# Patient Record
Sex: MALE | Race: WHITE | Employment: FULL TIME | URBAN - METROPOLITAN AREA
[De-identification: names, ages, dates, MRNs, and addresses within clinical notes are randomized per-mention and may not be internally consistent; named-entity substitution may affect disease eponyms.]

---

## 2017-01-26 ENCOUNTER — OFFICE VISIT (OUTPATIENT)
Dept: PSYCHIATRY | Age: 32
End: 2017-01-26

## 2017-01-26 VITALS — SYSTOLIC BLOOD PRESSURE: 134 MMHG | BODY MASS INDEX: 29.16 KG/M2 | DIASTOLIC BLOOD PRESSURE: 88 MMHG | WEIGHT: 221 LBS

## 2017-01-26 DIAGNOSIS — F33.1 MODERATE EPISODE OF RECURRENT MAJOR DEPRESSIVE DISORDER (HCC): Primary | ICD-10-CM

## 2017-01-26 PROCEDURE — 99214 OFFICE O/P EST MOD 30 MIN: CPT | Performed by: PSYCHIATRY & NEUROLOGY

## 2017-01-26 RX ORDER — TRAZODONE HYDROCHLORIDE 50 MG/1
50 TABLET ORAL NIGHTLY PRN
Qty: 30 TABLET | Refills: 1 | Status: SHIPPED | OUTPATIENT
Start: 2017-01-26 | End: 2017-03-17 | Stop reason: SDUPTHER

## 2017-01-26 RX ORDER — DULOXETIN HYDROCHLORIDE 30 MG/1
90 CAPSULE, DELAYED RELEASE ORAL EVERY MORNING
Qty: 90 CAPSULE | Refills: 3 | Status: SHIPPED | OUTPATIENT
Start: 2017-01-26 | End: 2017-03-17 | Stop reason: SDUPTHER

## 2017-01-26 ASSESSMENT — ENCOUNTER SYMPTOMS
VOMITING: 0
BLURRED VISION: 0
ABDOMINAL PAIN: 0
NAUSEA: 0
SHORTNESS OF BREATH: 0
DIARRHEA: 0
CONSTIPATION: 0

## 2017-03-17 ENCOUNTER — OFFICE VISIT (OUTPATIENT)
Dept: PSYCHIATRY | Age: 32
End: 2017-03-17

## 2017-03-17 VITALS — SYSTOLIC BLOOD PRESSURE: 124 MMHG | DIASTOLIC BLOOD PRESSURE: 72 MMHG | WEIGHT: 229.4 LBS | BODY MASS INDEX: 30.27 KG/M2

## 2017-03-17 DIAGNOSIS — F33.1 MODERATE EPISODE OF RECURRENT MAJOR DEPRESSIVE DISORDER (HCC): Primary | ICD-10-CM

## 2017-03-17 PROCEDURE — 99214 OFFICE O/P EST MOD 30 MIN: CPT | Performed by: PSYCHIATRY & NEUROLOGY

## 2017-03-17 RX ORDER — DULOXETIN HYDROCHLORIDE 60 MG/1
60 CAPSULE, DELAYED RELEASE ORAL DAILY
Qty: 30 CAPSULE | Refills: 5 | Status: SHIPPED | OUTPATIENT
Start: 2017-03-17 | End: 2017-09-25 | Stop reason: SDUPTHER

## 2017-03-17 RX ORDER — GABAPENTIN 300 MG/1
300 CAPSULE ORAL 3 TIMES DAILY
Qty: 90 CAPSULE | Refills: 5 | Status: SHIPPED | OUTPATIENT
Start: 2017-03-17 | End: 2017-09-25

## 2017-03-17 RX ORDER — DULOXETIN HYDROCHLORIDE 30 MG/1
30 CAPSULE, DELAYED RELEASE ORAL EVERY MORNING
Qty: 30 CAPSULE | Refills: 5 | Status: SHIPPED | OUTPATIENT
Start: 2017-03-17 | End: 2017-09-25 | Stop reason: SDUPTHER

## 2017-03-17 RX ORDER — TRAZODONE HYDROCHLORIDE 50 MG/1
50 TABLET ORAL NIGHTLY PRN
Qty: 30 TABLET | Refills: 5 | Status: SHIPPED | OUTPATIENT
Start: 2017-03-17 | End: 2017-10-23 | Stop reason: SDUPTHER

## 2017-03-17 ASSESSMENT — ENCOUNTER SYMPTOMS
VOMITING: 0
ABDOMINAL PAIN: 0
BLURRED VISION: 0
NAUSEA: 0
DIARRHEA: 0
CONSTIPATION: 0
SHORTNESS OF BREATH: 0

## 2017-07-06 ENCOUNTER — OFFICE VISIT (OUTPATIENT)
Dept: FAMILY MEDICINE CLINIC | Age: 32
End: 2017-07-06

## 2017-07-06 VITALS
HEART RATE: 70 BPM | HEIGHT: 73 IN | SYSTOLIC BLOOD PRESSURE: 136 MMHG | OXYGEN SATURATION: 96 % | RESPIRATION RATE: 18 BRPM | DIASTOLIC BLOOD PRESSURE: 84 MMHG | BODY MASS INDEX: 29.26 KG/M2 | WEIGHT: 220.8 LBS

## 2017-07-06 DIAGNOSIS — K42.9 UMBILICAL HERNIA WITHOUT OBSTRUCTION AND WITHOUT GANGRENE: ICD-10-CM

## 2017-07-06 PROCEDURE — 99213 OFFICE O/P EST LOW 20 MIN: CPT | Performed by: INTERNAL MEDICINE

## 2017-07-06 ASSESSMENT — ENCOUNTER SYMPTOMS
ALLERGIC/IMMUNOLOGIC NEGATIVE: 1
DIARRHEA: 0
CONSTIPATION: 0
BLOOD IN STOOL: 0
RECTAL PAIN: 0
ABDOMINAL PAIN: 1
ABDOMINAL DISTENTION: 0
VOMITING: 0
ANAL BLEEDING: 0
NAUSEA: 0
RESPIRATORY NEGATIVE: 1

## 2017-07-06 ASSESSMENT — PATIENT HEALTH QUESTIONNAIRE - PHQ9
1. LITTLE INTEREST OR PLEASURE IN DOING THINGS: 1
SUM OF ALL RESPONSES TO PHQ9 QUESTIONS 1 & 2: 1
2. FEELING DOWN, DEPRESSED OR HOPELESS: 0
SUM OF ALL RESPONSES TO PHQ QUESTIONS 1-9: 1

## 2017-07-13 ENCOUNTER — OFFICE VISIT (OUTPATIENT)
Dept: PSYCHOLOGY | Age: 32
End: 2017-07-13

## 2017-07-13 DIAGNOSIS — F40.298 SPECIFIC PHOBIA: Primary | ICD-10-CM

## 2017-07-13 DIAGNOSIS — F33.1 MDD (MAJOR DEPRESSIVE DISORDER), RECURRENT EPISODE, MODERATE (HCC): ICD-10-CM

## 2017-07-13 PROCEDURE — 90791 PSYCH DIAGNOSTIC EVALUATION: CPT | Performed by: SOCIAL WORKER

## 2017-07-13 ASSESSMENT — PATIENT HEALTH QUESTIONNAIRE - PHQ9
2. FEELING DOWN, DEPRESSED OR HOPELESS: 1
3. TROUBLE FALLING OR STAYING ASLEEP: 2
SUM OF ALL RESPONSES TO PHQ9 QUESTIONS 1 & 2: 2
4. FEELING TIRED OR HAVING LITTLE ENERGY: 1
SUM OF ALL RESPONSES TO PHQ QUESTIONS 1-9: 10
5. POOR APPETITE OR OVEREATING: 0
9. THOUGHTS THAT YOU WOULD BE BETTER OFF DEAD, OR OF HURTING YOURSELF: 0
10. IF YOU CHECKED OFF ANY PROBLEMS, HOW DIFFICULT HAVE THESE PROBLEMS MADE IT FOR YOU TO DO YOUR WORK, TAKE CARE OF THINGS AT HOME, OR GET ALONG WITH OTHER PEOPLE: 3
7. TROUBLE CONCENTRATING ON THINGS, SUCH AS READING THE NEWSPAPER OR WATCHING TELEVISION: 1
8. MOVING OR SPEAKING SO SLOWLY THAT OTHER PEOPLE COULD HAVE NOTICED. OR THE OPPOSITE, BEING SO FIGETY OR RESTLESS THAT YOU HAVE BEEN MOVING AROUND A LOT MORE THAN USUAL: 1
1. LITTLE INTEREST OR PLEASURE IN DOING THINGS: 1
6. FEELING BAD ABOUT YOURSELF - OR THAT YOU ARE A FAILURE OR HAVE LET YOURSELF OR YOUR FAMILY DOWN: 3

## 2017-07-14 ENCOUNTER — INITIAL CONSULT (OUTPATIENT)
Dept: SURGERY | Age: 32
End: 2017-07-14

## 2017-07-14 ENCOUNTER — SURG/PROC ORDERS (OUTPATIENT)
Dept: SURGERY | Age: 32
End: 2017-07-14

## 2017-07-14 VITALS
HEIGHT: 74 IN | HEART RATE: 75 BPM | SYSTOLIC BLOOD PRESSURE: 113 MMHG | BODY MASS INDEX: 28.42 KG/M2 | DIASTOLIC BLOOD PRESSURE: 76 MMHG | WEIGHT: 221.4 LBS

## 2017-07-14 DIAGNOSIS — K42.9 UMBILICAL HERNIA WITHOUT OBSTRUCTION AND WITHOUT GANGRENE: Primary | ICD-10-CM

## 2017-07-14 PROCEDURE — 99242 OFF/OP CONSLTJ NEW/EST SF 20: CPT | Performed by: SURGERY

## 2017-07-14 RX ORDER — SODIUM CHLORIDE 0.9 % (FLUSH) 0.9 %
10 SYRINGE (ML) INJECTION PRN
Status: CANCELLED | OUTPATIENT
Start: 2017-07-14

## 2017-07-14 RX ORDER — SODIUM CHLORIDE 0.9 % (FLUSH) 0.9 %
10 SYRINGE (ML) INJECTION EVERY 12 HOURS SCHEDULED
Status: CANCELLED | OUTPATIENT
Start: 2017-07-14

## 2017-07-25 ENCOUNTER — OFFICE VISIT (OUTPATIENT)
Dept: FAMILY MEDICINE CLINIC | Age: 32
End: 2017-07-25

## 2017-07-25 VITALS
DIASTOLIC BLOOD PRESSURE: 80 MMHG | BODY MASS INDEX: 28.06 KG/M2 | SYSTOLIC BLOOD PRESSURE: 118 MMHG | OXYGEN SATURATION: 95 % | HEART RATE: 67 BPM | HEIGHT: 74 IN | RESPIRATION RATE: 18 BRPM | WEIGHT: 218.6 LBS

## 2017-07-25 DIAGNOSIS — I10 ESSENTIAL HYPERTENSION, BENIGN: Primary | ICD-10-CM

## 2017-07-25 DIAGNOSIS — F41.8 DEPRESSION WITH ANXIETY: ICD-10-CM

## 2017-07-25 DIAGNOSIS — E78.00 PURE HYPERCHOLESTEROLEMIA: ICD-10-CM

## 2017-07-25 DIAGNOSIS — K42.9 UMBILICAL HERNIA WITHOUT OBSTRUCTION AND WITHOUT GANGRENE: ICD-10-CM

## 2017-07-25 LAB
ALBUMIN SERPL-MCNC: 4.8 G/DL (ref 3.4–5)
ALP BLD-CCNC: 103 U/L (ref 40–129)
ALT SERPL-CCNC: 68 U/L (ref 10–40)
ANION GAP SERPL CALCULATED.3IONS-SCNC: 18 MMOL/L (ref 3–16)
AST SERPL-CCNC: 61 U/L (ref 15–37)
BILIRUB SERPL-MCNC: 0.5 MG/DL (ref 0–1)
BILIRUBIN DIRECT: <0.2 MG/DL (ref 0–0.3)
BILIRUBIN, INDIRECT: ABNORMAL MG/DL (ref 0–1)
BUN BLDV-MCNC: 7 MG/DL (ref 7–20)
CALCIUM SERPL-MCNC: 9.9 MG/DL (ref 8.3–10.6)
CHLORIDE BLD-SCNC: 99 MMOL/L (ref 99–110)
CHOLESTEROL, TOTAL: 234 MG/DL (ref 0–199)
CO2: 25 MMOL/L (ref 21–32)
CREAT SERPL-MCNC: 0.7 MG/DL (ref 0.9–1.3)
GFR AFRICAN AMERICAN: >60
GFR NON-AFRICAN AMERICAN: >60
GLUCOSE BLD-MCNC: 85 MG/DL (ref 70–99)
HDLC SERPL-MCNC: 73 MG/DL (ref 40–60)
LDL CHOLESTEROL CALCULATED: ABNORMAL MG/DL
LDL CHOLESTEROL DIRECT: 124 MG/DL
POTASSIUM SERPL-SCNC: 4.6 MMOL/L (ref 3.5–5.1)
SODIUM BLD-SCNC: 142 MMOL/L (ref 136–145)
TOTAL PROTEIN: 7.3 G/DL (ref 6.4–8.2)
TRIGL SERPL-MCNC: 339 MG/DL (ref 0–150)
VLDLC SERPL CALC-MCNC: ABNORMAL MG/DL

## 2017-07-25 PROCEDURE — 99214 OFFICE O/P EST MOD 30 MIN: CPT | Performed by: INTERNAL MEDICINE

## 2017-07-25 PROCEDURE — 36415 COLL VENOUS BLD VENIPUNCTURE: CPT | Performed by: INTERNAL MEDICINE

## 2017-07-25 RX ORDER — ROSUVASTATIN CALCIUM 40 MG/1
40 TABLET, COATED ORAL DAILY
Qty: 30 TABLET | Refills: 5 | Status: SHIPPED | OUTPATIENT
Start: 2017-07-25 | End: 2017-12-14 | Stop reason: SDUPTHER

## 2017-07-25 RX ORDER — AMLODIPINE BESYLATE AND BENAZEPRIL HYDROCHLORIDE 5; 20 MG/1; MG/1
1 CAPSULE ORAL DAILY
Qty: 30 CAPSULE | Refills: 5 | Status: SHIPPED | OUTPATIENT
Start: 2017-07-25 | End: 2017-12-14 | Stop reason: SDUPTHER

## 2017-07-25 ASSESSMENT — ENCOUNTER SYMPTOMS
ALLERGIC/IMMUNOLOGIC NEGATIVE: 1
RESPIRATORY NEGATIVE: 1
GASTROINTESTINAL NEGATIVE: 1

## 2017-07-26 ENCOUNTER — OFFICE VISIT (OUTPATIENT)
Dept: PSYCHOLOGY | Age: 32
End: 2017-07-26

## 2017-07-26 DIAGNOSIS — F40.298 SPECIFIC PHOBIA: Primary | ICD-10-CM

## 2017-07-26 PROCEDURE — 90832 PSYTX W PT 30 MINUTES: CPT | Performed by: SOCIAL WORKER

## 2017-07-26 ASSESSMENT — PATIENT HEALTH QUESTIONNAIRE - PHQ9
SUM OF ALL RESPONSES TO PHQ QUESTIONS 1-9: 5
8. MOVING OR SPEAKING SO SLOWLY THAT OTHER PEOPLE COULD HAVE NOTICED. OR THE OPPOSITE, BEING SO FIGETY OR RESTLESS THAT YOU HAVE BEEN MOVING AROUND A LOT MORE THAN USUAL: 0
4. FEELING TIRED OR HAVING LITTLE ENERGY: 0
10. IF YOU CHECKED OFF ANY PROBLEMS, HOW DIFFICULT HAVE THESE PROBLEMS MADE IT FOR YOU TO DO YOUR WORK, TAKE CARE OF THINGS AT HOME, OR GET ALONG WITH OTHER PEOPLE: 0
5. POOR APPETITE OR OVEREATING: 0
SUM OF ALL RESPONSES TO PHQ9 QUESTIONS 1 & 2: 2
7. TROUBLE CONCENTRATING ON THINGS, SUCH AS READING THE NEWSPAPER OR WATCHING TELEVISION: 1
6. FEELING BAD ABOUT YOURSELF - OR THAT YOU ARE A FAILURE OR HAVE LET YOURSELF OR YOUR FAMILY DOWN: 1
1. LITTLE INTEREST OR PLEASURE IN DOING THINGS: 1
2. FEELING DOWN, DEPRESSED OR HOPELESS: 1
3. TROUBLE FALLING OR STAYING ASLEEP: 1
9. THOUGHTS THAT YOU WOULD BE BETTER OFF DEAD, OR OF HURTING YOURSELF: 0

## 2017-07-27 DIAGNOSIS — E78.00 PURE HYPERCHOLESTEROLEMIA: Primary | ICD-10-CM

## 2017-07-27 RX ORDER — FENOFIBRATE 160 MG/1
160 TABLET ORAL DAILY
Qty: 30 TABLET | Refills: 1 | Status: SHIPPED | OUTPATIENT
Start: 2017-07-27 | End: 2017-10-22 | Stop reason: SDUPTHER

## 2017-08-04 ENCOUNTER — OFFICE VISIT (OUTPATIENT)
Dept: FAMILY MEDICINE CLINIC | Age: 32
End: 2017-08-04

## 2017-08-04 VITALS
BODY MASS INDEX: 28.11 KG/M2 | HEIGHT: 74 IN | WEIGHT: 219 LBS | DIASTOLIC BLOOD PRESSURE: 80 MMHG | SYSTOLIC BLOOD PRESSURE: 122 MMHG | OXYGEN SATURATION: 98 % | HEART RATE: 70 BPM

## 2017-08-04 DIAGNOSIS — Z01.818 PREOP EXAMINATION: ICD-10-CM

## 2017-08-04 DIAGNOSIS — K42.9 UMBILICAL HERNIA WITHOUT OBSTRUCTION AND WITHOUT GANGRENE: Primary | ICD-10-CM

## 2017-08-04 PROCEDURE — 99242 OFF/OP CONSLTJ NEW/EST SF 20: CPT | Performed by: PHYSICIAN ASSISTANT

## 2017-08-09 ENCOUNTER — OFFICE VISIT (OUTPATIENT)
Dept: PSYCHOLOGY | Age: 32
End: 2017-08-09

## 2017-08-09 DIAGNOSIS — F40.298 SPECIFIC PHOBIA: Primary | ICD-10-CM

## 2017-08-09 PROCEDURE — 90832 PSYTX W PT 30 MINUTES: CPT | Performed by: SOCIAL WORKER

## 2017-08-09 ASSESSMENT — PATIENT HEALTH QUESTIONNAIRE - PHQ9
2. FEELING DOWN, DEPRESSED OR HOPELESS: 1
10. IF YOU CHECKED OFF ANY PROBLEMS, HOW DIFFICULT HAVE THESE PROBLEMS MADE IT FOR YOU TO DO YOUR WORK, TAKE CARE OF THINGS AT HOME, OR GET ALONG WITH OTHER PEOPLE: 2
SUM OF ALL RESPONSES TO PHQ QUESTIONS 1-9: 13
4. FEELING TIRED OR HAVING LITTLE ENERGY: 2
SUM OF ALL RESPONSES TO PHQ9 QUESTIONS 1 & 2: 2
7. TROUBLE CONCENTRATING ON THINGS, SUCH AS READING THE NEWSPAPER OR WATCHING TELEVISION: 2
6. FEELING BAD ABOUT YOURSELF - OR THAT YOU ARE A FAILURE OR HAVE LET YOURSELF OR YOUR FAMILY DOWN: 2
1. LITTLE INTEREST OR PLEASURE IN DOING THINGS: 1
8. MOVING OR SPEAKING SO SLOWLY THAT OTHER PEOPLE COULD HAVE NOTICED. OR THE OPPOSITE, BEING SO FIGETY OR RESTLESS THAT YOU HAVE BEEN MOVING AROUND A LOT MORE THAN USUAL: 1
5. POOR APPETITE OR OVEREATING: 1
9. THOUGHTS THAT YOU WOULD BE BETTER OFF DEAD, OR OF HURTING YOURSELF: 0
3. TROUBLE FALLING OR STAYING ASLEEP: 3

## 2017-08-10 ENCOUNTER — TELEPHONE (OUTPATIENT)
Dept: SURGERY | Age: 32
End: 2017-08-10

## 2017-08-10 RX ORDER — BUSPIRONE HYDROCHLORIDE 7.5 MG/1
7.5 TABLET ORAL 2 TIMES DAILY
Qty: 60 TABLET | Refills: 0 | Status: SHIPPED | OUTPATIENT
Start: 2017-08-10 | End: 2017-09-25

## 2017-08-17 ENCOUNTER — HOSPITAL ENCOUNTER (OUTPATIENT)
Dept: SURGERY | Age: 32
Discharge: HOME | End: 2017-08-17
Attending: SURGERY | Admitting: SURGERY

## 2017-08-17 VITALS
WEIGHT: 230 LBS | OXYGEN SATURATION: 100 % | TEMPERATURE: 97.9 F | SYSTOLIC BLOOD PRESSURE: 108 MMHG | RESPIRATION RATE: 16 BRPM | HEIGHT: 74 IN | BODY MASS INDEX: 29.52 KG/M2 | HEART RATE: 78 BPM | DIASTOLIC BLOOD PRESSURE: 56 MMHG

## 2017-08-17 PROCEDURE — 49585 REPAIR UMBILICAL HERN,5+Y/O,REDUC: CPT | Performed by: SURGERY

## 2017-08-17 RX ORDER — HYDRALAZINE HYDROCHLORIDE 20 MG/ML
5 INJECTION INTRAMUSCULAR; INTRAVENOUS EVERY 10 MIN PRN
Status: DISCONTINUED | OUTPATIENT
Start: 2017-08-17 | End: 2017-08-17 | Stop reason: SDUPTHER

## 2017-08-17 RX ORDER — SODIUM CHLORIDE 0.9 % (FLUSH) 0.9 %
10 SYRINGE (ML) INJECTION PRN
Status: DISCONTINUED | OUTPATIENT
Start: 2017-08-17 | End: 2017-08-18 | Stop reason: HOSPADM

## 2017-08-17 RX ORDER — DIPHENHYDRAMINE HYDROCHLORIDE 50 MG/ML
12.5 INJECTION INTRAMUSCULAR; INTRAVENOUS
Status: ACTIVE | OUTPATIENT
Start: 2017-08-17 | End: 2017-08-17

## 2017-08-17 RX ORDER — PROMETHAZINE HYDROCHLORIDE 25 MG/ML
6.25 INJECTION, SOLUTION INTRAMUSCULAR; INTRAVENOUS EVERY 10 MIN PRN
Status: DISCONTINUED | OUTPATIENT
Start: 2017-08-17 | End: 2017-08-18 | Stop reason: HOSPADM

## 2017-08-17 RX ORDER — MEPERIDINE HYDROCHLORIDE 50 MG/ML
12.5 INJECTION INTRAMUSCULAR; INTRAVENOUS; SUBCUTANEOUS EVERY 5 MIN PRN
Status: DISCONTINUED | OUTPATIENT
Start: 2017-08-17 | End: 2017-08-17 | Stop reason: SDUPTHER

## 2017-08-17 RX ORDER — OXYCODONE HYDROCHLORIDE AND ACETAMINOPHEN 5; 325 MG/1; MG/1
1 TABLET ORAL
Status: DISPENSED | OUTPATIENT
Start: 2017-08-17 | End: 2017-08-17

## 2017-08-17 RX ORDER — HYDRALAZINE HYDROCHLORIDE 20 MG/ML
5 INJECTION INTRAMUSCULAR; INTRAVENOUS EVERY 10 MIN PRN
Status: DISCONTINUED | OUTPATIENT
Start: 2017-08-17 | End: 2017-08-18 | Stop reason: HOSPADM

## 2017-08-17 RX ORDER — MORPHINE SULFATE 2 MG/ML
2 INJECTION, SOLUTION INTRAMUSCULAR; INTRAVENOUS EVERY 5 MIN PRN
Status: DISCONTINUED | OUTPATIENT
Start: 2017-08-17 | End: 2017-08-18 | Stop reason: HOSPADM

## 2017-08-17 RX ORDER — MORPHINE SULFATE 2 MG/ML
1 INJECTION, SOLUTION INTRAMUSCULAR; INTRAVENOUS EVERY 5 MIN PRN
Status: DISCONTINUED | OUTPATIENT
Start: 2017-08-17 | End: 2017-08-18 | Stop reason: HOSPADM

## 2017-08-17 RX ORDER — PROMETHAZINE HYDROCHLORIDE 25 MG/ML
6.25 INJECTION, SOLUTION INTRAMUSCULAR; INTRAVENOUS
Status: ACTIVE | OUTPATIENT
Start: 2017-08-17 | End: 2017-08-17

## 2017-08-17 RX ORDER — MEPERIDINE HYDROCHLORIDE 50 MG/ML
12.5 INJECTION INTRAMUSCULAR; INTRAVENOUS; SUBCUTANEOUS EVERY 5 MIN PRN
Status: DISCONTINUED | OUTPATIENT
Start: 2017-08-17 | End: 2017-08-18 | Stop reason: HOSPADM

## 2017-08-17 RX ORDER — LABETALOL HYDROCHLORIDE 5 MG/ML
5 INJECTION, SOLUTION INTRAVENOUS EVERY 10 MIN PRN
Status: DISCONTINUED | OUTPATIENT
Start: 2017-08-17 | End: 2017-08-17 | Stop reason: SDUPTHER

## 2017-08-17 RX ORDER — OXYCODONE HYDROCHLORIDE AND ACETAMINOPHEN 5; 325 MG/1; MG/1
1-2 TABLET ORAL EVERY 4 HOURS PRN
Qty: 20 TABLET | Refills: 0 | Status: SHIPPED | OUTPATIENT
Start: 2017-08-17 | End: 2017-08-24

## 2017-08-17 RX ORDER — ONDANSETRON 2 MG/ML
4 INJECTION INTRAMUSCULAR; INTRAVENOUS
Status: DISCONTINUED | OUTPATIENT
Start: 2017-08-17 | End: 2017-08-17 | Stop reason: SDUPTHER

## 2017-08-17 RX ORDER — SODIUM CHLORIDE, SODIUM LACTATE, POTASSIUM CHLORIDE, CALCIUM CHLORIDE 600; 310; 30; 20 MG/100ML; MG/100ML; MG/100ML; MG/100ML
INJECTION, SOLUTION INTRAVENOUS CONTINUOUS
Status: DISCONTINUED | OUTPATIENT
Start: 2017-08-17 | End: 2017-08-18 | Stop reason: HOSPADM

## 2017-08-17 RX ORDER — LABETALOL HYDROCHLORIDE 5 MG/ML
5 INJECTION, SOLUTION INTRAVENOUS EVERY 10 MIN PRN
Status: DISCONTINUED | OUTPATIENT
Start: 2017-08-17 | End: 2017-08-18 | Stop reason: HOSPADM

## 2017-08-17 RX ORDER — OXYCODONE HYDROCHLORIDE AND ACETAMINOPHEN 5; 325 MG/1; MG/1
2 TABLET ORAL PRN
Status: COMPLETED | OUTPATIENT
Start: 2017-08-17 | End: 2017-08-17

## 2017-08-17 RX ORDER — OXYCODONE HYDROCHLORIDE AND ACETAMINOPHEN 5; 325 MG/1; MG/1
1 TABLET ORAL PRN
Status: COMPLETED | OUTPATIENT
Start: 2017-08-17 | End: 2017-08-17

## 2017-08-17 RX ORDER — SODIUM CHLORIDE 0.9 % (FLUSH) 0.9 %
10 SYRINGE (ML) INJECTION EVERY 12 HOURS SCHEDULED
Status: DISCONTINUED | OUTPATIENT
Start: 2017-08-17 | End: 2017-08-18 | Stop reason: HOSPADM

## 2017-08-17 RX ORDER — ONDANSETRON 2 MG/ML
4 INJECTION INTRAMUSCULAR; INTRAVENOUS
Status: ACTIVE | OUTPATIENT
Start: 2017-08-17 | End: 2017-08-17

## 2017-08-17 RX ADMIN — Medication 0.25 MG: at 09:24

## 2017-08-17 RX ADMIN — SODIUM CHLORIDE, SODIUM LACTATE, POTASSIUM CHLORIDE, CALCIUM CHLORIDE: 600; 310; 30; 20 INJECTION, SOLUTION INTRAVENOUS at 06:52

## 2017-08-17 RX ADMIN — OXYCODONE HYDROCHLORIDE AND ACETAMINOPHEN 2 TABLET: 5; 325 TABLET ORAL at 09:15

## 2017-08-17 RX ADMIN — Medication 0.25 MG: at 09:12

## 2017-08-17 RX ADMIN — Medication 0.25 MG: at 09:04

## 2017-08-17 ASSESSMENT — PAIN SCALES - GENERAL
PAINLEVEL_OUTOF10: 6
PAINLEVEL_OUTOF10: 4

## 2017-08-17 ASSESSMENT — PAIN DESCRIPTION - LOCATION: LOCATION: ABDOMEN

## 2017-08-17 ASSESSMENT — PAIN DESCRIPTION - DESCRIPTORS: DESCRIPTORS: SORE

## 2017-08-17 ASSESSMENT — PAIN DESCRIPTION - PAIN TYPE: TYPE: SURGICAL PAIN

## 2017-08-24 ENCOUNTER — OFFICE VISIT (OUTPATIENT)
Dept: PSYCHOLOGY | Age: 32
End: 2017-08-24

## 2017-08-24 ENCOUNTER — NURSE ONLY (OUTPATIENT)
Dept: FAMILY MEDICINE CLINIC | Age: 32
End: 2017-08-24

## 2017-08-24 DIAGNOSIS — E78.00 PURE HYPERCHOLESTEROLEMIA: ICD-10-CM

## 2017-08-24 DIAGNOSIS — F40.298 SPECIFIC PHOBIA: Primary | ICD-10-CM

## 2017-08-24 LAB
ALBUMIN SERPL-MCNC: 4.6 G/DL (ref 3.4–5)
ALP BLD-CCNC: 61 U/L (ref 40–129)
ALT SERPL-CCNC: 30 U/L (ref 10–40)
ANION GAP SERPL CALCULATED.3IONS-SCNC: 17 MMOL/L (ref 3–16)
AST SERPL-CCNC: 34 U/L (ref 15–37)
BILIRUB SERPL-MCNC: 0.3 MG/DL (ref 0–1)
BILIRUBIN DIRECT: <0.2 MG/DL (ref 0–0.3)
BILIRUBIN, INDIRECT: NORMAL MG/DL (ref 0–1)
BUN BLDV-MCNC: 11 MG/DL (ref 7–20)
CALCIUM SERPL-MCNC: 10 MG/DL (ref 8.3–10.6)
CHLORIDE BLD-SCNC: 99 MMOL/L (ref 99–110)
CHOLESTEROL, TOTAL: 246 MG/DL (ref 0–199)
CO2: 26 MMOL/L (ref 21–32)
CREAT SERPL-MCNC: 0.8 MG/DL (ref 0.9–1.3)
GFR AFRICAN AMERICAN: >60
GFR NON-AFRICAN AMERICAN: >60
GLUCOSE BLD-MCNC: 85 MG/DL (ref 70–99)
HDLC SERPL-MCNC: 60 MG/DL (ref 40–60)
LDL CHOLESTEROL CALCULATED: ABNORMAL MG/DL
LDL CHOLESTEROL DIRECT: 132 MG/DL
PHOSPHORUS: 4 MG/DL (ref 2.5–4.9)
POTASSIUM SERPL-SCNC: 5 MMOL/L (ref 3.5–5.1)
SODIUM BLD-SCNC: 142 MMOL/L (ref 136–145)
TOTAL CK: 106 U/L (ref 39–308)
TOTAL PROTEIN: 7 G/DL (ref 6.4–8.2)
TRIGL SERPL-MCNC: 414 MG/DL (ref 0–150)
VLDLC SERPL CALC-MCNC: ABNORMAL MG/DL

## 2017-08-24 PROCEDURE — 90832 PSYTX W PT 30 MINUTES: CPT | Performed by: SOCIAL WORKER

## 2017-08-24 PROCEDURE — 36415 COLL VENOUS BLD VENIPUNCTURE: CPT | Performed by: INTERNAL MEDICINE

## 2017-08-24 ASSESSMENT — PATIENT HEALTH QUESTIONNAIRE - PHQ9
7. TROUBLE CONCENTRATING ON THINGS, SUCH AS READING THE NEWSPAPER OR WATCHING TELEVISION: 1
9. THOUGHTS THAT YOU WOULD BE BETTER OFF DEAD, OR OF HURTING YOURSELF: 0
4. FEELING TIRED OR HAVING LITTLE ENERGY: 2
SUM OF ALL RESPONSES TO PHQ QUESTIONS 1-9: 10
8. MOVING OR SPEAKING SO SLOWLY THAT OTHER PEOPLE COULD HAVE NOTICED. OR THE OPPOSITE, BEING SO FIGETY OR RESTLESS THAT YOU HAVE BEEN MOVING AROUND A LOT MORE THAN USUAL: 0
SUM OF ALL RESPONSES TO PHQ9 QUESTIONS 1 & 2: 3
1. LITTLE INTEREST OR PLEASURE IN DOING THINGS: 2
3. TROUBLE FALLING OR STAYING ASLEEP: 3
6. FEELING BAD ABOUT YOURSELF - OR THAT YOU ARE A FAILURE OR HAVE LET YOURSELF OR YOUR FAMILY DOWN: 1
5. POOR APPETITE OR OVEREATING: 0
10. IF YOU CHECKED OFF ANY PROBLEMS, HOW DIFFICULT HAVE THESE PROBLEMS MADE IT FOR YOU TO DO YOUR WORK, TAKE CARE OF THINGS AT HOME, OR GET ALONG WITH OTHER PEOPLE: 1
2. FEELING DOWN, DEPRESSED OR HOPELESS: 1

## 2017-09-05 ENCOUNTER — OFFICE VISIT (OUTPATIENT)
Dept: SURGERY | Age: 32
End: 2017-09-05

## 2017-09-05 VITALS
SYSTOLIC BLOOD PRESSURE: 115 MMHG | WEIGHT: 231 LBS | BODY MASS INDEX: 30.62 KG/M2 | DIASTOLIC BLOOD PRESSURE: 73 MMHG | HEART RATE: 83 BPM | HEIGHT: 73 IN

## 2017-09-05 DIAGNOSIS — Z09 POSTOP CHECK: ICD-10-CM

## 2017-09-05 PROCEDURE — 99024 POSTOP FOLLOW-UP VISIT: CPT | Performed by: SURGERY

## 2017-09-07 ENCOUNTER — TELEPHONE (OUTPATIENT)
Dept: FAMILY MEDICINE CLINIC | Age: 32
End: 2017-09-07

## 2017-09-07 NOTE — TELEPHONE ENCOUNTER
IF HE IS GOING TO NEED TIME OFF WORK WILL PROBABLY NEED TO SEE [employer] AND THE PSYCHIATRIST [employer]YLA WORKS W/ SO HE WILL HAVE GOOD DOCUMENTATION.

## 2017-09-07 NOTE — TELEPHONE ENCOUNTER
Patient is having difficulty at work and his manager is working with him to get help. His manager has told him he may be in need of a performance report and possibly need to take a leave of absence to get his anxiety and medications in order. He would like to discuss changing his medications. I advised an appointment would need to be made in order to discuss the adjustment, how he is feeling and how it is effecting his job. He agreed. He has an appointment with Victoria Perez next Tuesday (moved up from the 21st). No regular appointment slots available before then to see Dr. Nii Harrison. Does Dr. Nii Harrison need to see him since he is seeing Victoria Perez, place him in the same day tomorrow at 5:00, Monday or. ... ?

## 2017-09-12 ENCOUNTER — OFFICE VISIT (OUTPATIENT)
Dept: PSYCHOLOGY | Age: 32
End: 2017-09-12

## 2017-09-12 DIAGNOSIS — F41.1 GAD (GENERALIZED ANXIETY DISORDER): Primary | ICD-10-CM

## 2017-09-12 DIAGNOSIS — F32.A DEPRESSIVE DISORDER: ICD-10-CM

## 2017-09-12 PROCEDURE — 90832 PSYTX W PT 30 MINUTES: CPT | Performed by: SOCIAL WORKER

## 2017-09-12 ASSESSMENT — PATIENT HEALTH QUESTIONNAIRE - PHQ9
8. MOVING OR SPEAKING SO SLOWLY THAT OTHER PEOPLE COULD HAVE NOTICED. OR THE OPPOSITE, BEING SO FIGETY OR RESTLESS THAT YOU HAVE BEEN MOVING AROUND A LOT MORE THAN USUAL: 2
SUM OF ALL RESPONSES TO PHQ9 QUESTIONS 1 & 2: 5
7. TROUBLE CONCENTRATING ON THINGS, SUCH AS READING THE NEWSPAPER OR WATCHING TELEVISION: 3
1. LITTLE INTEREST OR PLEASURE IN DOING THINGS: 2
9. THOUGHTS THAT YOU WOULD BE BETTER OFF DEAD, OR OF HURTING YOURSELF: 1
4. FEELING TIRED OR HAVING LITTLE ENERGY: 3
6. FEELING BAD ABOUT YOURSELF - OR THAT YOU ARE A FAILURE OR HAVE LET YOURSELF OR YOUR FAMILY DOWN: 3
2. FEELING DOWN, DEPRESSED OR HOPELESS: 3
10. IF YOU CHECKED OFF ANY PROBLEMS, HOW DIFFICULT HAVE THESE PROBLEMS MADE IT FOR YOU TO DO YOUR WORK, TAKE CARE OF THINGS AT HOME, OR GET ALONG WITH OTHER PEOPLE: 2
3. TROUBLE FALLING OR STAYING ASLEEP: 3
SUM OF ALL RESPONSES TO PHQ QUESTIONS 1-9: 22
5. POOR APPETITE OR OVEREATING: 2

## 2017-09-25 ENCOUNTER — OFFICE VISIT (OUTPATIENT)
Dept: PSYCHIATRY | Age: 32
End: 2017-09-25

## 2017-09-25 VITALS
HEIGHT: 73 IN | OXYGEN SATURATION: 98 % | HEART RATE: 91 BPM | BODY MASS INDEX: 29.16 KG/M2 | DIASTOLIC BLOOD PRESSURE: 80 MMHG | SYSTOLIC BLOOD PRESSURE: 120 MMHG | WEIGHT: 220 LBS

## 2017-09-25 DIAGNOSIS — F10.10 ALCOHOL ABUSE, CONTINUOUS DRINKING BEHAVIOR: ICD-10-CM

## 2017-09-25 DIAGNOSIS — F40.10 SOCIAL ANXIETY DISORDER: ICD-10-CM

## 2017-09-25 DIAGNOSIS — F33.0 MILD EPISODE OF RECURRENT MAJOR DEPRESSIVE DISORDER (HCC): Primary | ICD-10-CM

## 2017-09-25 PROCEDURE — 99204 OFFICE O/P NEW MOD 45 MIN: CPT | Performed by: PSYCHIATRY & NEUROLOGY

## 2017-09-25 RX ORDER — DULOXETIN HYDROCHLORIDE 30 MG/1
30 CAPSULE, DELAYED RELEASE ORAL EVERY MORNING
Qty: 30 CAPSULE | Refills: 5 | Status: SHIPPED | OUTPATIENT
Start: 2017-09-25 | End: 2017-11-20

## 2017-09-25 RX ORDER — DULOXETIN HYDROCHLORIDE 60 MG/1
60 CAPSULE, DELAYED RELEASE ORAL DAILY
Qty: 30 CAPSULE | Refills: 5 | Status: SHIPPED | OUTPATIENT
Start: 2017-09-25 | End: 2017-12-14 | Stop reason: SDUPTHER

## 2017-09-25 RX ORDER — PROPRANOLOL HYDROCHLORIDE 10 MG/1
10 TABLET ORAL DAILY PRN
Qty: 60 TABLET | Refills: 3 | Status: SHIPPED | OUTPATIENT
Start: 2017-09-25 | End: 2019-08-07 | Stop reason: SDUPTHER

## 2017-09-25 ASSESSMENT — ENCOUNTER SYMPTOMS
GASTROINTESTINAL NEGATIVE: 1
RESPIRATORY NEGATIVE: 1
EYES NEGATIVE: 1

## 2017-09-28 ENCOUNTER — OFFICE VISIT (OUTPATIENT)
Dept: PSYCHOLOGY | Age: 32
End: 2017-09-28

## 2017-09-28 DIAGNOSIS — F40.10 SOCIAL ANXIETY DISORDER: Primary | ICD-10-CM

## 2017-09-28 PROCEDURE — 90832 PSYTX W PT 30 MINUTES: CPT | Performed by: SOCIAL WORKER

## 2017-09-28 ASSESSMENT — PATIENT HEALTH QUESTIONNAIRE - PHQ9
4. FEELING TIRED OR HAVING LITTLE ENERGY: 2
7. TROUBLE CONCENTRATING ON THINGS, SUCH AS READING THE NEWSPAPER OR WATCHING TELEVISION: 3
SUM OF ALL RESPONSES TO PHQ9 QUESTIONS 1 & 2: 3
SUM OF ALL RESPONSES TO PHQ QUESTIONS 1-9: 11
3. TROUBLE FALLING OR STAYING ASLEEP: 1
8. MOVING OR SPEAKING SO SLOWLY THAT OTHER PEOPLE COULD HAVE NOTICED. OR THE OPPOSITE, BEING SO FIGETY OR RESTLESS THAT YOU HAVE BEEN MOVING AROUND A LOT MORE THAN USUAL: 0
1. LITTLE INTEREST OR PLEASURE IN DOING THINGS: 2
10. IF YOU CHECKED OFF ANY PROBLEMS, HOW DIFFICULT HAVE THESE PROBLEMS MADE IT FOR YOU TO DO YOUR WORK, TAKE CARE OF THINGS AT HOME, OR GET ALONG WITH OTHER PEOPLE: 2
6. FEELING BAD ABOUT YOURSELF - OR THAT YOU ARE A FAILURE OR HAVE LET YOURSELF OR YOUR FAMILY DOWN: 2
2. FEELING DOWN, DEPRESSED OR HOPELESS: 1
9. THOUGHTS THAT YOU WOULD BE BETTER OFF DEAD, OR OF HURTING YOURSELF: 0
5. POOR APPETITE OR OVEREATING: 0

## 2017-10-23 ENCOUNTER — OFFICE VISIT (OUTPATIENT)
Dept: PSYCHIATRY | Age: 32
End: 2017-10-23

## 2017-10-23 VITALS
BODY MASS INDEX: 29.16 KG/M2 | HEIGHT: 73 IN | WEIGHT: 220 LBS | HEART RATE: 90 BPM | OXYGEN SATURATION: 98 % | SYSTOLIC BLOOD PRESSURE: 120 MMHG | DIASTOLIC BLOOD PRESSURE: 80 MMHG

## 2017-10-23 DIAGNOSIS — F40.10 SOCIAL ANXIETY DISORDER: ICD-10-CM

## 2017-10-23 DIAGNOSIS — F33.0 MILD EPISODE OF RECURRENT MAJOR DEPRESSIVE DISORDER (HCC): Primary | ICD-10-CM

## 2017-10-23 PROCEDURE — 99214 OFFICE O/P EST MOD 30 MIN: CPT | Performed by: PSYCHIATRY & NEUROLOGY

## 2017-10-23 PROCEDURE — G8417 CALC BMI ABV UP PARAM F/U: HCPCS | Performed by: PSYCHIATRY & NEUROLOGY

## 2017-10-23 PROCEDURE — G8484 FLU IMMUNIZE NO ADMIN: HCPCS | Performed by: PSYCHIATRY & NEUROLOGY

## 2017-10-23 PROCEDURE — G8427 DOCREV CUR MEDS BY ELIG CLIN: HCPCS | Performed by: PSYCHIATRY & NEUROLOGY

## 2017-10-23 PROCEDURE — 4004F PT TOBACCO SCREEN RCVD TLK: CPT | Performed by: PSYCHIATRY & NEUROLOGY

## 2017-10-23 RX ORDER — FENOFIBRATE 160 MG/1
160 TABLET ORAL DAILY
Qty: 30 TABLET | Refills: 5 | Status: SHIPPED | OUTPATIENT
Start: 2017-10-23 | End: 2017-12-14 | Stop reason: SDUPTHER

## 2017-10-23 RX ORDER — BUPROPION HYDROCHLORIDE 150 MG/1
150 TABLET ORAL EVERY MORNING
Qty: 60 TABLET | Refills: 2 | Status: SHIPPED | OUTPATIENT
Start: 2017-10-23 | End: 2017-12-14 | Stop reason: SDUPTHER

## 2017-10-23 RX ORDER — BUPROPION HYDROCHLORIDE 150 MG/1
150 TABLET ORAL EVERY MORNING
Qty: 30 TABLET | Refills: 2 | Status: SHIPPED | OUTPATIENT
Start: 2017-10-23 | End: 2017-10-23 | Stop reason: SDUPTHER

## 2017-10-23 RX ORDER — GABAPENTIN 100 MG/1
100 CAPSULE ORAL 3 TIMES DAILY
Qty: 90 CAPSULE | Refills: 3 | Status: SHIPPED | OUTPATIENT
Start: 2017-10-23 | End: 2017-11-20

## 2017-10-23 RX ORDER — TRAZODONE HYDROCHLORIDE 50 MG/1
50 TABLET ORAL NIGHTLY PRN
Qty: 30 TABLET | Refills: 5 | Status: SHIPPED | OUTPATIENT
Start: 2017-10-23 | End: 2019-08-07 | Stop reason: SDUPTHER

## 2017-10-23 ASSESSMENT — ENCOUNTER SYMPTOMS
RESPIRATORY NEGATIVE: 1
GASTROINTESTINAL NEGATIVE: 1
EYES NEGATIVE: 1

## 2017-10-23 NOTE — PROGRESS NOTES
Outpatient Psychiatric Progress Note  Osei Dickerson M.D. Date: 10/23/2017    Total Duration of Visit: 25min    Vital Signs: /80   Pulse 90   Ht 6' 1\" (1.854 m)   Wt 220 lb (99.8 kg)   SpO2 98%   BMI 29.03 kg/m²     Chief Complaint/Reason For Visit:   Chief Complaint   Patient presents with    Depression    Anxiety        Interval History: Pt thought he was doing better but then got contacted from  again. Director had spoken with  stating that pt's work is still inconsistent. They suggested possible ADHD testing and short term leave till meds could be better managed. Pt did have dx ADHD in college but when adderall would wear off would feel terrible so he stopped it. Mood is better but fiance notes it fluctuates. Pt says some days at work he is super efficient, then other days does not produce as much. Often forgets things. Will reread pages w/o absorbing information. More active. Fiance moving in with him. No SI/HI. Some hopeless about job and worried about doing the right thing. Needs to pay for wedding so really doesn't want to take leave w/o pay. Has used inderal effectively for phone meetings a few times. Pt will be getting new director. Sleep and appetite fair. Some reduction in libido with cymbalta. MSE:     Appropriate grooming and hygiene. Appears stated age. Good eye contact. Speech spontaneous and non-pressured. Mood disappointed about work situation. Affect congruent. TP linear. TC free of SI/HI, no evidence of IOR/IOP. Pt denies any perceptual abnormalities. The pt did not exhibit abnormal movements or tremor. The pt was not restless or agitated. The pt was alert and oriented in all spheres. Pt demonstrated good fund of knowledge and good recall of recent and remote events.   The patient demonstrated an appropriate level of insight into their diagnosis and treatment recommendations, including risks, benefits and alternatives of the recommended medications and therapy 246* 0 - 199 mg/dL Final    Triglycerides 08/24/2017 414* 0 - 150 mg/dL Final    HDL 08/24/2017 60  40 - 60 mg/dL Final    LDL Calculated 08/24/2017 see below  <100 mg/dL Final    VLDL CHOLESTEROL CALCULATED 08/24/2017 see below  Not Established mg/dL Final    Sodium 08/24/2017 142  136 - 145 mmol/L Final    Potassium 08/24/2017 5.0  3.5 - 5.1 mmol/L Final    Chloride 08/24/2017 99  99 - 110 mmol/L Final    CO2 08/24/2017 26  21 - 32 mmol/L Final    Anion Gap 08/24/2017 17* 3 - 16 Final    Glucose 08/24/2017 85  70 - 99 mg/dL Final    BUN 08/24/2017 11  7 - 20 mg/dL Final    CREATININE 08/24/2017 0.8* 0.9 - 1.3 mg/dL Final    GFR Non- 08/24/2017 >60  >60 Final    GFR  08/24/2017 >60  >60 Final    Calcium 08/24/2017 10.0  8.3 - 10.6 mg/dL Final    Phosphorus 08/24/2017 4.0  2.5 - 4.9 mg/dL Final    Alb 08/24/2017 4.6  3.4 - 5.0 g/dL Final    Total CK 08/24/2017 106  39 - 308 U/L Final    Total Protein 08/24/2017 7.0  6.4 - 8.2 g/dL Final    Alkaline Phosphatase 08/24/2017 61  40 - 129 U/L Final    ALT 08/24/2017 30  10 - 40 U/L Final    AST 08/24/2017 34  15 - 37 U/L Final    Total Bilirubin 08/24/2017 0.3  0.0 - 1.0 mg/dL Final    Bilirubin, Direct 08/24/2017 <0.2  0.0 - 0.3 mg/dL Final    Bilirubin, Indirect 08/24/2017 see below  0.0 - 1.0 mg/dL Final   Orders Only on 07/25/2017   Component Date Value Ref Range Status    LDL Direct 07/25/2017 124* <100 mg/dL Final   Office Visit on 07/25/2017   Component Date Value Ref Range Status    Sodium 07/25/2017 142  136 - 145 mmol/L Final    Potassium 07/25/2017 4.6  3.5 - 5.1 mmol/L Final    Chloride 07/25/2017 99  99 - 110 mmol/L Final    CO2 07/25/2017 25  21 - 32 mmol/L Final    Anion Gap 07/25/2017 18* 3 - 16 Final    Glucose 07/25/2017 85  70 - 99 mg/dL Final    BUN 07/25/2017 7  7 - 20 mg/dL Final    CREATININE 07/25/2017 0.7* 0.9 - 1.3 mg/dL Final    GFR Non- 07/25/2017 >60  >60 Final  GFR  07/25/2017 >60  >60 Final    Calcium 07/25/2017 9.9  8.3 - 10.6 mg/dL Final    Total Protein 07/25/2017 7.3  6.4 - 8.2 g/dL Final    Alb 07/25/2017 4.8  3.4 - 5.0 g/dL Final    Alkaline Phosphatase 07/25/2017 103  40 - 129 U/L Final    ALT 07/25/2017 68* 10 - 40 U/L Final    AST 07/25/2017 61* 15 - 37 U/L Final    Total Bilirubin 07/25/2017 0.5  0.0 - 1.0 mg/dL Final    Bilirubin, Direct 07/25/2017 <0.2  0.0 - 0.3 mg/dL Final    Bilirubin, Indirect 07/25/2017 see below  0.0 - 1.0 mg/dL Final    Cholesterol, Total 07/25/2017 234* 0 - 199 mg/dL Final    Triglycerides 07/25/2017 339* 0 - 150 mg/dL Final    HDL 07/25/2017 73* 40 - 60 mg/dL Final    LDL Calculated 07/25/2017 see below  <100 mg/dL Final    VLDL CHOLESTEROL CALCULATED 07/25/2017 see below  Not Established mg/dL Final         Assessment:  Diagnosis:   1. Mild episode of recurrent major depressive disorder ([address]    2. Social anxiety disorder    R/O ADHD    Recommendations:  1. MDD: Pt still having some cognitive problems at work. Seems cyclical. Not sure if he wants to get testing for ADHD. Discussed non stimulant that would also help with depression and possibly improve sexual SE from Cymbalta. Continue cymbalta 90mg daily. Start wellbutrin XL 150mg daily X 2 weeks then increase to 300mg daily. Reviewed R/B/SE. Continue inderal 10-20mg prn anxiety  Willing to complete FMLA  And wrote a note for pt stating he is under my care. No safety issues. Continue to see Zetta Severs.    F/U in 1 month

## 2017-11-10 RX ORDER — FENOFIBRATE 160 MG/1
160 TABLET ORAL DAILY
Qty: 30 TABLET | Refills: 5 | Status: SHIPPED | OUTPATIENT
Start: 2017-11-10 | End: 2018-07-13 | Stop reason: SDUPTHER

## 2017-11-20 ENCOUNTER — OFFICE VISIT (OUTPATIENT)
Dept: PSYCHIATRY | Age: 32
End: 2017-11-20

## 2017-11-20 VITALS
DIASTOLIC BLOOD PRESSURE: 80 MMHG | HEART RATE: 90 BPM | WEIGHT: 220 LBS | BODY MASS INDEX: 29.16 KG/M2 | HEIGHT: 73 IN | SYSTOLIC BLOOD PRESSURE: 120 MMHG | OXYGEN SATURATION: 98 %

## 2017-11-20 DIAGNOSIS — F90.0 ATTENTION DEFICIT HYPERACTIVITY DISORDER (ADHD), PREDOMINANTLY INATTENTIVE TYPE: ICD-10-CM

## 2017-11-20 DIAGNOSIS — F33.41 RECURRENT MAJOR DEPRESSIVE DISORDER, IN PARTIAL REMISSION (HCC): Primary | ICD-10-CM

## 2017-11-20 DIAGNOSIS — F40.10 SOCIAL ANXIETY DISORDER: ICD-10-CM

## 2017-11-20 PROCEDURE — 99214 OFFICE O/P EST MOD 30 MIN: CPT | Performed by: PSYCHIATRY & NEUROLOGY

## 2017-11-20 ASSESSMENT — ENCOUNTER SYMPTOMS
EYES NEGATIVE: 1
GASTROINTESTINAL NEGATIVE: 1
RESPIRATORY NEGATIVE: 1

## 2017-11-20 NOTE — PROGRESS NOTES
Outpatient Psychiatric Progress Note  Dipesh Fisher M.D. Date: 11/20/2017    Total Duration of Visit: 25min    Vital Signs: /80   Pulse 90   Ht 6' 1\" (1.854 m)   Wt 220 lb (99.8 kg)   SpO2 98%   BMI 29.03 kg/m²     Chief Complaint/Reason For Visit:   Chief Complaint   Patient presents with    Depression    Anxiety        Interval History: Doing well. No depression or anxiety. Feels that he is doing well at work and hasn't gotten anymore feedback from [address]. Has new supervisor who is much better and less stressful. Uses trazodone about 2X/ week to sleep. Continues to have sexual se from cymbalta. He and gf have not been active in over a month. He is scheduled for ADHD testing By [facility] but is concerned that the appt is only 30min. Last time he got testing it was for 2 hours in college. No SI/HI. Completing things on time. MSE:     Appropriate grooming and hygiene. Appears stated age. Good eye contact. Speech spontaneous and non-pressured. Mood good  Affect congruent. TP linear. TC free of SI/HI, no evidence of IOR/IOP. Pt denies any perceptual abnormalities. The pt did not exhibit abnormal movements or tremor. The pt was not restless or agitated. The pt was alert and oriented in all spheres. Pt demonstrated good fund of knowledge and good recall of recent and remote events. The patient demonstrated an appropriate level of insight into their diagnosis and treatment recommendations, including risks, benefits and alternatives of the recommended medications and therapy interventions. Review of Systems   Constitutional: Negative. HENT: Negative. Eyes: Negative. Respiratory: Negative. Cardiovascular: Negative. Gastrointestinal: Negative. Genitourinary: Negative. Musculoskeletal: Negative. Skin: Negative. Neurological: Negative. Endo/Heme/Allergies: Negative.           Current Medications:   Current Outpatient Prescriptions   Medication Sig Dispense Refill    fenofibrate 160 MG tablet TAKE 1 TABLET BY MOUTH DAILY 30 tablet 5    fenofibrate 160 MG tablet TAKE 1 TABLET BY MOUTH DAILY 30 tablet 5    traZODone (DESYREL) 50 MG tablet Take 1 tablet by mouth nightly as needed for Sleep 30 tablet 5    buPROPion (WELLBUTRIN XL) 150 MG extended release tablet Take 1 tablet by mouth every morning For 2 weeks then increase to 300mg daily 60 tablet 2    DULoxetine (CYMBALTA) 60 MG extended release capsule Take 1 capsule by mouth daily Take in AM with 30 mg cap dose for total of 90 mg a day 30 capsule 5    propranolol (INDERAL) 10 MG tablet Take 1 tablet by mouth daily as needed (anxiety) Take 1-2 tabs (10-20mg) 30min prior to anxiety provoking situation. 60 tablet 3    amLODIPine-benazepril (LOTREL) 5-20 MG per capsule Take 1 capsule by mouth daily 30 capsule 5    rosuvastatin (CRESTOR) 40 MG tablet Take 1 tablet by mouth daily 30 tablet 5    naproxen (NAPROSYN) 500 MG tablet Take 1 tablet by mouth 2 times daily (with meals). 60 tablet 3     No current facility-administered medications for this visit.         Labs/Imaging/EKG:    Orders Only on 08/24/2017   Component Date Value Ref Range Status    LDL Direct 08/24/2017 132* <100 mg/dL Final   Nurse Only on 08/24/2017   Component Date Value Ref Range Status    Cholesterol, Total 08/24/2017 246* 0 - 199 mg/dL Final    Triglycerides 08/24/2017 414* 0 - 150 mg/dL Final    HDL 08/24/2017 60  40 - 60 mg/dL Final    LDL Calculated 08/24/2017 see below  <100 mg/dL Final    VLDL CHOLESTEROL CALCULATED 08/24/2017 see below  Not Established mg/dL Final    Sodium 08/24/2017 142  136 - 145 mmol/L Final    Potassium 08/24/2017 5.0  3.5 - 5.1 mmol/L Final    Chloride 08/24/2017 99  99 - 110 mmol/L Final    CO2 08/24/2017 26  21 - 32 mmol/L Final    Anion Gap 08/24/2017 17* 3 - 16 Final    Glucose 08/24/2017 85  70 - 99 mg/dL Final    BUN 08/24/2017 11  7 - 20 mg/dL Final    CREATININE 08/24/2017 0.8* 0.9 - 1.3 mg/dL Final    GFR Non- 08/24/2017 >60  >60 Final    GFR  08/24/2017 >60  >60 Final    Calcium 08/24/2017 10.0  8.3 - 10.6 mg/dL Final    Phosphorus 08/24/2017 4.0  2.5 - 4.9 mg/dL Final    Alb 08/24/2017 4.6  3.4 - 5.0 g/dL Final    Total CK 08/24/2017 106  39 - 308 U/L Final    Total Protein 08/24/2017 7.0  6.4 - 8.2 g/dL Final    Alkaline Phosphatase 08/24/2017 61  40 - 129 U/L Final    ALT 08/24/2017 30  10 - 40 U/L Final    AST 08/24/2017 34  15 - 37 U/L Final    Total Bilirubin 08/24/2017 0.3  0.0 - 1.0 mg/dL Final    Bilirubin, Direct 08/24/2017 <0.2  0.0 - 0.3 mg/dL Final    Bilirubin, Indirect 08/24/2017 see below  0.0 - 1.0 mg/dL Final   Orders Only on 07/25/2017   Component Date Value Ref Range Status    LDL Direct 07/25/2017 124* <100 mg/dL Final   Office Visit on 07/25/2017   Component Date Value Ref Range Status    Sodium 07/25/2017 142  136 - 145 mmol/L Final    Potassium 07/25/2017 4.6  3.5 - 5.1 mmol/L Final    Chloride 07/25/2017 99  99 - 110 mmol/L Final    CO2 07/25/2017 25  21 - 32 mmol/L Final    Anion Gap 07/25/2017 18* 3 - 16 Final    Glucose 07/25/2017 85  70 - 99 mg/dL Final    BUN 07/25/2017 7  7 - 20 mg/dL Final    CREATININE 07/25/2017 0.7* 0.9 - 1.3 mg/dL Final    GFR Non- 07/25/2017 >60  >60 Final    GFR  07/25/2017 >60  >60 Final    Calcium 07/25/2017 9.9  8.3 - 10.6 mg/dL Final    Total Protein 07/25/2017 7.3  6.4 - 8.2 g/dL Final    Alb 07/25/2017 4.8  3.4 - 5.0 g/dL Final    Alkaline Phosphatase 07/25/2017 103  40 - 129 U/L Final    ALT 07/25/2017 68* 10 - 40 U/L Final    AST 07/25/2017 61* 15 - 37 U/L Final    Total Bilirubin 07/25/2017 0.5  0.0 - 1.0 mg/dL Final    Bilirubin, Direct 07/25/2017 <0.2  0.0 - 0.3 mg/dL Final    Bilirubin, Indirect 07/25/2017 see below  0.0 - 1.0 mg/dL Final    Cholesterol, Total 07/25/2017 234* 0 - 199 mg/dL Final    Triglycerides 07/25/2017 339* 0 - 150

## 2017-11-22 ENCOUNTER — OFFICE VISIT (OUTPATIENT)
Dept: PSYCHOLOGY | Age: 32
End: 2017-11-22

## 2017-11-22 DIAGNOSIS — F90.0 ATTENTION DEFICIT HYPERACTIVITY DISORDER (ADHD), INATTENTIVE TYPE, MODERATE: Primary | ICD-10-CM

## 2017-11-22 PROCEDURE — 90832 PSYTX W PT 30 MINUTES: CPT | Performed by: SOCIAL WORKER

## 2017-11-22 ASSESSMENT — PATIENT HEALTH QUESTIONNAIRE - PHQ9
9. THOUGHTS THAT YOU WOULD BE BETTER OFF DEAD, OR OF HURTING YOURSELF: 0
3. TROUBLE FALLING OR STAYING ASLEEP: 2
5. POOR APPETITE OR OVEREATING: 2
7. TROUBLE CONCENTRATING ON THINGS, SUCH AS READING THE NEWSPAPER OR WATCHING TELEVISION: 3
SUM OF ALL RESPONSES TO PHQ QUESTIONS 1-9: 14
2. FEELING DOWN, DEPRESSED OR HOPELESS: 2
SUM OF ALL RESPONSES TO PHQ9 QUESTIONS 1 & 2: 3
4. FEELING TIRED OR HAVING LITTLE ENERGY: 2
1. LITTLE INTEREST OR PLEASURE IN DOING THINGS: 1
10. IF YOU CHECKED OFF ANY PROBLEMS, HOW DIFFICULT HAVE THESE PROBLEMS MADE IT FOR YOU TO DO YOUR WORK, TAKE CARE OF THINGS AT HOME, OR GET ALONG WITH OTHER PEOPLE: 2
6. FEELING BAD ABOUT YOURSELF - OR THAT YOU ARE A FAILURE OR HAVE LET YOURSELF OR YOUR FAMILY DOWN: 2
8. MOVING OR SPEAKING SO SLOWLY THAT OTHER PEOPLE COULD HAVE NOTICED. OR THE OPPOSITE, BEING SO FIGETY OR RESTLESS THAT YOU HAVE BEEN MOVING AROUND A LOT MORE THAN USUAL: 0

## 2017-11-22 NOTE — Clinical Note
Jorge Marquez will be interested to see if work performance improves with [employer]. Also wonder if his anxiety improves during his public speaking engagements at work. If he is constantly losing his focus during presentations,  I can only imagine how that would increase anxiety with all eyes on you. It has happened to me : )

## 2017-12-14 DIAGNOSIS — F33.0 MILD EPISODE OF RECURRENT MAJOR DEPRESSIVE DISORDER (HCC): ICD-10-CM

## 2017-12-14 RX ORDER — AMLODIPINE BESYLATE AND BENAZEPRIL HYDROCHLORIDE 5; 20 MG/1; MG/1
1 CAPSULE ORAL DAILY
Qty: 90 CAPSULE | Refills: 1 | Status: SHIPPED | OUTPATIENT
Start: 2017-12-14 | End: 2018-07-13 | Stop reason: DRUGHIGH

## 2017-12-14 RX ORDER — FENOFIBRATE 160 MG/1
160 TABLET ORAL DAILY
Qty: 90 TABLET | Refills: 1 | Status: SHIPPED | OUTPATIENT
Start: 2017-12-14 | End: 2018-09-20 | Stop reason: SDUPTHER

## 2017-12-14 RX ORDER — ROSUVASTATIN CALCIUM 40 MG/1
40 TABLET, COATED ORAL DAILY
Qty: 90 TABLET | Refills: 1 | Status: SHIPPED | OUTPATIENT
Start: 2017-12-14 | End: 2018-05-07 | Stop reason: SDUPTHER

## 2017-12-14 RX ORDER — BUPROPION HYDROCHLORIDE 150 MG/1
150 TABLET ORAL EVERY MORNING
Qty: 180 TABLET | Refills: 1 | Status: SHIPPED | OUTPATIENT
Start: 2017-12-14 | End: 2018-01-15

## 2017-12-14 RX ORDER — DULOXETIN HYDROCHLORIDE 60 MG/1
60 CAPSULE, DELAYED RELEASE ORAL DAILY
Qty: 90 CAPSULE | Refills: 1 | Status: SHIPPED | OUTPATIENT
Start: 2017-12-14 | End: 2017-12-18

## 2017-12-18 ENCOUNTER — INITIAL CONSULT (OUTPATIENT)
Dept: PSYCHIATRY | Age: 32
End: 2017-12-18

## 2017-12-18 VITALS
DIASTOLIC BLOOD PRESSURE: 100 MMHG | WEIGHT: 221 LBS | BODY MASS INDEX: 29.16 KG/M2 | OXYGEN SATURATION: 98 % | HEART RATE: 94 BPM | SYSTOLIC BLOOD PRESSURE: 140 MMHG

## 2017-12-18 DIAGNOSIS — F40.10 SOCIAL ANXIETY DISORDER: ICD-10-CM

## 2017-12-18 DIAGNOSIS — F33.42 RECURRENT MAJOR DEPRESSIVE DISORDER, IN FULL REMISSION (HCC): Primary | ICD-10-CM

## 2017-12-18 DIAGNOSIS — F90.9 ATTENTION DEFICIT HYPERACTIVITY DISORDER (ADHD), UNSPECIFIED ADHD TYPE: ICD-10-CM

## 2017-12-18 PROCEDURE — 99214 OFFICE O/P EST MOD 30 MIN: CPT | Performed by: PSYCHIATRY & NEUROLOGY

## 2017-12-18 RX ORDER — DULOXETIN HYDROCHLORIDE 30 MG/1
30 CAPSULE, DELAYED RELEASE ORAL DAILY
Qty: 30 CAPSULE | Refills: 2
Start: 2017-12-18 | End: 2017-12-18 | Stop reason: SDUPTHER

## 2017-12-18 RX ORDER — DULOXETIN HYDROCHLORIDE 30 MG/1
30 CAPSULE, DELAYED RELEASE ORAL DAILY
Qty: 30 CAPSULE | Refills: 2 | Status: SHIPPED | OUTPATIENT
Start: 2017-12-18 | End: 2018-01-15

## 2017-12-18 ASSESSMENT — ENCOUNTER SYMPTOMS
RESPIRATORY NEGATIVE: 1
EYES NEGATIVE: 1
GASTROINTESTINAL NEGATIVE: 1

## 2017-12-18 NOTE — PROGRESS NOTES
Outpatient Psychiatric Progress Note  Dipesh Fisher M.D. Date: 12/18/2017    Total Duration of Visit: 25min    Vital Signs: BP (!) 140/100   Pulse 94   Wt 221 lb (100.2 kg)   SpO2 98%   BMI 29.16 kg/m²     Chief Complaint/Reason For Visit:   No chief complaint on file. Interval History: Had appt with Bekah Mcnulty for ADHD testing which was positive. Father is pharmacist and feels pt was on too much while in college. (adderall xr 20mg qam, adderall 10-20mg afternoon) Father has ADHD and takes wellbutrin and adderall. Does not get jittery. Pt was initially anxious about reducing cymbalta but did not notice any change in depression anxiety. Still doing really well. Things going well at work. Feels more consistent with completing projects vs doing a lot then nothing. Still with low libido despite reduction. Rarely using trazodone or inderal. Mood good. No SI/HI. Everything stable. Willing to reduce cymbalta further. BP elevated today, but pt reports missed medication. MSE:     Appropriate grooming and hygiene. Appears stated age. Good eye contact. Speech spontaneous and non-pressured. Mood good  Affect congruent. TP linear. TC free of SI/HI, no evidence of IOR/IOP. Pt denies any perceptual abnormalities. The pt did not exhibit abnormal movements or tremor. The pt was not restless or agitated. The pt was alert and oriented in all spheres. Pt demonstrated good fund of knowledge and good recall of recent and remote events. The patient demonstrated an appropriate level of insight into their diagnosis and treatment recommendations, including risks, benefits and alternatives of the recommended medications and therapy interventions. Review of Systems   Constitutional: Negative. HENT: Negative. Eyes: Negative. Respiratory: Negative. Cardiovascular: Negative. Gastrointestinal: Negative. Genitourinary: Negative. Musculoskeletal: Negative. Skin: Negative. Neurological: Negative. Endo/Heme/Allergies: Negative. Current Medications:   Current Outpatient Prescriptions   Medication Sig Dispense Refill    DULoxetine (CYMBALTA) 30 MG extended release capsule Take 1 capsule by mouth daily 30 capsule 2    fenofibrate 160 MG tablet Take 1 tablet by mouth daily 90 tablet 1    buPROPion (WELLBUTRIN XL) 150 MG extended release tablet Take 1 tablet by mouth every morning For 2 weeks then increase to 300mg daily 180 tablet 1    amLODIPine-benazepril (LOTREL) 5-20 MG per capsule Take 1 capsule by mouth daily 90 capsule 1    rosuvastatin (CRESTOR) 40 MG tablet Take 1 tablet by mouth daily 90 tablet 1    fenofibrate 160 MG tablet TAKE 1 TABLET BY MOUTH DAILY 30 tablet 5    traZODone (DESYREL) 50 MG tablet Take 1 tablet by mouth nightly as needed for Sleep 30 tablet 5    propranolol (INDERAL) 10 MG tablet Take 1 tablet by mouth daily as needed (anxiety) Take 1-2 tabs (10-20mg) 30min prior to anxiety provoking situation. 60 tablet 3    naproxen (NAPROSYN) 500 MG tablet Take 1 tablet by mouth 2 times daily (with meals). 60 tablet 3     No current facility-administered medications for this visit.         Labs/Imaging/EKG:    Orders Only on 08/24/2017   Component Date Value Ref Range Status    LDL Direct 08/24/2017 132* <100 mg/dL Final   Nurse Only on 08/24/2017   Component Date Value Ref Range Status    Cholesterol, Total 08/24/2017 246* 0 - 199 mg/dL Final    Triglycerides 08/24/2017 414* 0 - 150 mg/dL Final    HDL 08/24/2017 60  40 - 60 mg/dL Final    LDL Calculated 08/24/2017 see below  <100 mg/dL Final    VLDL CHOLESTEROL CALCULATED 08/24/2017 see below  Not Established mg/dL Final    Sodium 08/24/2017 142  136 - 145 mmol/L Final    Potassium 08/24/2017 5.0  3.5 - 5.1 mmol/L Final    Chloride 08/24/2017 99  99 - 110 mmol/L Final    CO2 08/24/2017 26  21 - 32 mmol/L Final    Anion Gap 08/24/2017 17* 3 - 16 Final    Glucose 08/24/2017 85  70 - 99 mg/dL Final    BUN 08/24/2017 11  7 - 20 mg/dL Final    CREATININE 08/24/2017 0.8* 0.9 - 1.3 mg/dL Final    GFR Non- 08/24/2017 >60  >60 Final    GFR  08/24/2017 >60  >60 Final    Calcium 08/24/2017 10.0  8.3 - 10.6 mg/dL Final    Phosphorus 08/24/2017 4.0  2.5 - 4.9 mg/dL Final    Alb 08/24/2017 4.6  3.4 - 5.0 g/dL Final    Total CK 08/24/2017 106  39 - 308 U/L Final    Total Protein 08/24/2017 7.0  6.4 - 8.2 g/dL Final    Alkaline Phosphatase 08/24/2017 61  40 - 129 U/L Final    ALT 08/24/2017 30  10 - 40 U/L Final    AST 08/24/2017 34  15 - 37 U/L Final    Total Bilirubin 08/24/2017 0.3  0.0 - 1.0 mg/dL Final    Bilirubin, Direct 08/24/2017 <0.2  0.0 - 0.3 mg/dL Final    Bilirubin, Indirect 08/24/2017 see below  0.0 - 1.0 mg/dL Final   Orders Only on 07/25/2017   Component Date Value Ref Range Status    LDL Direct 07/25/2017 124* <100 mg/dL Final   Office Visit on 07/25/2017   Component Date Value Ref Range Status    Sodium 07/25/2017 142  136 - 145 mmol/L Final    Potassium 07/25/2017 4.6  3.5 - 5.1 mmol/L Final    Chloride 07/25/2017 99  99 - 110 mmol/L Final    CO2 07/25/2017 25  21 - 32 mmol/L Final    Anion Gap 07/25/2017 18* 3 - 16 Final    Glucose 07/25/2017 85  70 - 99 mg/dL Final    BUN 07/25/2017 7  7 - 20 mg/dL Final    CREATININE 07/25/2017 0.7* 0.9 - 1.3 mg/dL Final    GFR Non- 07/25/2017 >60  >60 Final    GFR  07/25/2017 >60  >60 Final    Calcium 07/25/2017 9.9  8.3 - 10.6 mg/dL Final    Total Protein 07/25/2017 7.3  6.4 - 8.2 g/dL Final    Alb 07/25/2017 4.8  3.4 - 5.0 g/dL Final    Alkaline Phosphatase 07/25/2017 103  40 - 129 U/L Final    ALT 07/25/2017 68* 10 - 40 U/L Final    AST 07/25/2017 61* 15 - 37 U/L Final    Total Bilirubin 07/25/2017 0.5  0.0 - 1.0 mg/dL Final    Bilirubin, Direct 07/25/2017 <0.2  0.0 - 0.3 mg/dL Final    Bilirubin, Indirect 07/25/2017 see below  0.0 - 1.0 mg/dL Final    Cholesterol, Total 07/25/2017 234* 0 - 199 mg/dL Final    Triglycerides 07/25/2017 339* 0 - 150 mg/dL Final    HDL 07/25/2017 73* 40 - 60 mg/dL Final    LDL Calculated 07/25/2017 see below  <100 mg/dL Final    VLDL CHOLESTEROL CALCULATED 07/25/2017 see below  Not Established mg/dL Final         Assessment:  Diagnosis:   1. Recurrent major depressive disorder, in full remission ([address]    2. Social anxiety disorder    3. Attention deficit hyperactivity disorder (ADHD), unspecified ADHD type        Recommendations:  1. MDD: Pt with no depression and feels he is doing fine at work. Encouraged him to speak with someone at work that could help provide him helpful feedback about his productivity as pt was not aware he had problems in the past. Would like to transition pt to monotherapy of wellbutrin if possible. This may addres, depression, anxiety, adhd and not have sexual SE of SNRI nor the late day crash of stimulants. Also concerned about cardiac status with stimulant. Reduce cymbalta to 30mg daily with hope to d/c at next appt. Continue wellbutrin XL  300mg daily. Reviewed R/B/SE. May increase in future or add stimulant. Continue inderal 10-20mg prn anxiety  No safety issues. Continue to see [employer].    F/U in 1 month

## 2017-12-20 ENCOUNTER — OFFICE VISIT (OUTPATIENT)
Dept: PSYCHOLOGY | Age: 32
End: 2017-12-20

## 2017-12-20 DIAGNOSIS — F40.10 SOCIAL ANXIETY DISORDER: ICD-10-CM

## 2017-12-20 DIAGNOSIS — F33.42 MDD (MAJOR DEPRESSIVE DISORDER), RECURRENT, IN FULL REMISSION (HCC): Primary | ICD-10-CM

## 2017-12-20 PROCEDURE — 90832 PSYTX W PT 30 MINUTES: CPT | Performed by: SOCIAL WORKER

## 2017-12-20 ASSESSMENT — PATIENT HEALTH QUESTIONNAIRE - PHQ9
SUM OF ALL RESPONSES TO PHQ9 QUESTIONS 1 & 2: 3
1. LITTLE INTEREST OR PLEASURE IN DOING THINGS: 2
8. MOVING OR SPEAKING SO SLOWLY THAT OTHER PEOPLE COULD HAVE NOTICED. OR THE OPPOSITE, BEING SO FIGETY OR RESTLESS THAT YOU HAVE BEEN MOVING AROUND A LOT MORE THAN USUAL: 0
10. IF YOU CHECKED OFF ANY PROBLEMS, HOW DIFFICULT HAVE THESE PROBLEMS MADE IT FOR YOU TO DO YOUR WORK, TAKE CARE OF THINGS AT HOME, OR GET ALONG WITH OTHER PEOPLE: 1
SUM OF ALL RESPONSES TO PHQ QUESTIONS 1-9: 9
7. TROUBLE CONCENTRATING ON THINGS, SUCH AS READING THE NEWSPAPER OR WATCHING TELEVISION: 2
9. THOUGHTS THAT YOU WOULD BE BETTER OFF DEAD, OR OF HURTING YOURSELF: 0
5. POOR APPETITE OR OVEREATING: 0
2. FEELING DOWN, DEPRESSED OR HOPELESS: 1
6. FEELING BAD ABOUT YOURSELF - OR THAT YOU ARE A FAILURE OR HAVE LET YOURSELF OR YOUR FAMILY DOWN: 2
3. TROUBLE FALLING OR STAYING ASLEEP: 1
4. FEELING TIRED OR HAVING LITTLE ENERGY: 1

## 2017-12-20 NOTE — PROGRESS NOTES
Behavioral Health Consultation  Irais Yusra. [address]  12/20/2017  9:15 AM      Time spent with Patient: 30 minutes  This is patient's eighth  [facility] appointment. Reason for Consult:  depression and anxiety  Referring Provider: Juliano Garcia MD  [address] [place] [address] [address]  Laureano Johnson, [address]    Pt provided informed consent for the behavioral health program. Discussed with patient model of service to include the limits of confidentiality (i.e. abuse reporting, suicide intervention, etc.) and short-term intervention focused approach. Pt indicated understanding. Feedback given to PCP. S:  Pt overall is feeling pretty good, he is on vacation for the next two weeks. Working with Dr. Harshad Wang on decreasing Cymbalta and increasing wellbutrin. Overall pt is feeling more steady with productivity. He noticed that there were times that he was highly productive one day, and then not productive other days. Pt is feeling that he is more balanced. Pt decided against doing a leave of absence, he is feeling better and also didn't want to leave his team high and dry. Pt agrees that his ADHD may increase his anxiety during presentations. Interested to see if his thinking becomes more linear, and if the anxiety becomes less. Pt interested in checking in about 6 weeks, so he can see how he is doing once he returns back to work. Not exercising, but knows the value. No SI/HI.       O:    MSE:    Appearance    alert, cooperative  Appetite normal  Sleep disturbance Yes  Fatigue Yes  Loss of pleasure Yes  Impulsive behavior No  Speech    spontaneous, normal rate and normal volume  Mood    Anxious  Affect    normal affect  Thought Content    cognitive distortions  Thought Process    linear, goal directed and coherent  Associations    logical connections  Insight    Good  Judgment    Intact  Orientation    oriented to person, place, time, and general circumstances  Memory    recent and remote memory intact  Attention/Concentration    intact  Morbid ideation No  Suicide Assessment    no suicidal ideation      History:    Medications:   Current Outpatient Prescriptions   Medication Sig Dispense Refill    DULoxetine (CYMBALTA) 30 MG extended release capsule Take 1 capsule by mouth daily 30 capsule 2    fenofibrate 160 MG tablet Take 1 tablet by mouth daily 90 tablet 1    buPROPion (WELLBUTRIN XL) 150 MG extended release tablet Take 1 tablet by mouth every morning For 2 weeks then increase to 300mg daily 180 tablet 1    amLODIPine-benazepril (LOTREL) 5-20 MG per capsule Take 1 capsule by mouth daily 90 capsule 1    rosuvastatin (CRESTOR) 40 MG tablet Take 1 tablet by mouth daily 90 tablet 1    fenofibrate 160 MG tablet TAKE 1 TABLET BY MOUTH DAILY 30 tablet 5    traZODone (DESYREL) 50 MG tablet Take 1 tablet by mouth nightly as needed for Sleep 30 tablet 5    propranolol (INDERAL) 10 MG tablet Take 1 tablet by mouth daily as needed (anxiety) Take 1-2 tabs (10-20mg) 30min prior to anxiety provoking situation. 60 tablet 3    naproxen (NAPROSYN) 500 MG tablet Take 1 tablet by mouth 2 times daily (with meals). 60 tablet 3     No current facility-administered medications for this visit. Social History:   Social History     Social History    Marital status:      Spouse name: N/A    Number of children: N/A    Years of education: N/A     Occupational History    Not on file. Social History Main Topics    Smoking status: Former Smoker     Packs/day: 0.50     Years: 9.00     Types: Cigarettes     Quit date: 8/28/2011    Smokeless tobacco: Current User     Types: Chew    Alcohol use 3.6 oz/week     2 Glasses of wine, 2 Cans of beer, 2 Shots of liquor per week      Comment: daily    Drug use: No    Sexual activity: Not on file     Other Topics Concern    Not on file     Social History Narrative    No narrative on file       TOBACCO:   reports that he quit smoking about 6 years ago.  His to discuss productivity. \  2. Get back to your exercise  3. Return to see Edgar Stephen in 6 weeks.

## 2018-01-12 ENCOUNTER — OFFICE VISIT (OUTPATIENT)
Dept: FAMILY MEDICINE CLINIC | Age: 33
End: 2018-01-12

## 2018-01-12 VITALS
HEART RATE: 80 BPM | HEIGHT: 73 IN | DIASTOLIC BLOOD PRESSURE: 72 MMHG | OXYGEN SATURATION: 97 % | SYSTOLIC BLOOD PRESSURE: 110 MMHG | WEIGHT: 225 LBS | BODY MASS INDEX: 29.82 KG/M2

## 2018-01-12 DIAGNOSIS — F41.8 DEPRESSION WITH ANXIETY: ICD-10-CM

## 2018-01-12 DIAGNOSIS — E78.00 PURE HYPERCHOLESTEROLEMIA: ICD-10-CM

## 2018-01-12 DIAGNOSIS — K42.9 UMBILICAL HERNIA WITHOUT OBSTRUCTION AND WITHOUT GANGRENE: ICD-10-CM

## 2018-01-12 DIAGNOSIS — Z11.4 SCREENING FOR HIV (HUMAN IMMUNODEFICIENCY VIRUS): ICD-10-CM

## 2018-01-12 DIAGNOSIS — D22.9 ATYPICAL NEVI: ICD-10-CM

## 2018-01-12 DIAGNOSIS — Z00.00 ANNUAL PHYSICAL EXAM: Primary | ICD-10-CM

## 2018-01-12 DIAGNOSIS — I10 ESSENTIAL HYPERTENSION, BENIGN: ICD-10-CM

## 2018-01-12 DIAGNOSIS — Z78.9 ALCOHOL CONSUMPTION HEAVY: ICD-10-CM

## 2018-01-12 LAB
ALBUMIN SERPL-MCNC: 4.8 G/DL (ref 3.4–5)
ALP BLD-CCNC: 75 U/L (ref 40–129)
ALT SERPL-CCNC: 35 U/L (ref 10–40)
ANION GAP SERPL CALCULATED.3IONS-SCNC: 15 MMOL/L (ref 3–16)
AST SERPL-CCNC: 41 U/L (ref 15–37)
BASOPHILS ABSOLUTE: 0 K/UL (ref 0–0.2)
BASOPHILS RELATIVE PERCENT: 0.5 %
BILIRUB SERPL-MCNC: 0.5 MG/DL (ref 0–1)
BILIRUBIN DIRECT: <0.2 MG/DL (ref 0–0.3)
BILIRUBIN, INDIRECT: ABNORMAL MG/DL (ref 0–1)
BUN BLDV-MCNC: 9 MG/DL (ref 7–20)
CALCIUM SERPL-MCNC: 10 MG/DL (ref 8.3–10.6)
CHLORIDE BLD-SCNC: 98 MMOL/L (ref 99–110)
CHOLESTEROL, TOTAL: 236 MG/DL (ref 0–199)
CO2: 26 MMOL/L (ref 21–32)
CREAT SERPL-MCNC: 0.8 MG/DL (ref 0.9–1.3)
EOSINOPHILS ABSOLUTE: 0.2 K/UL (ref 0–0.6)
EOSINOPHILS RELATIVE PERCENT: 2.7 %
GFR AFRICAN AMERICAN: >60
GFR NON-AFRICAN AMERICAN: >60
GLUCOSE BLD-MCNC: 85 MG/DL (ref 70–99)
HCT VFR BLD CALC: 43.4 % (ref 40.5–52.5)
HDLC SERPL-MCNC: 60 MG/DL (ref 40–60)
HEMOGLOBIN: 14.6 G/DL (ref 13.5–17.5)
LDL CHOLESTEROL CALCULATED: ABNORMAL MG/DL
LDL CHOLESTEROL DIRECT: 123 MG/DL
LYMPHOCYTES ABSOLUTE: 2.5 K/UL (ref 1–5.1)
LYMPHOCYTES RELATIVE PERCENT: 30.3 %
MCH RBC QN AUTO: 31.9 PG (ref 26–34)
MCHC RBC AUTO-ENTMCNC: 33.7 G/DL (ref 31–36)
MCV RBC AUTO: 94.7 FL (ref 80–100)
MONOCYTES ABSOLUTE: 0.8 K/UL (ref 0–1.3)
MONOCYTES RELATIVE PERCENT: 9.6 %
NEUTROPHILS ABSOLUTE: 4.6 K/UL (ref 1.7–7.7)
NEUTROPHILS RELATIVE PERCENT: 56.9 %
PDW BLD-RTO: 13.9 % (ref 12.4–15.4)
PLATELET # BLD: 333 K/UL (ref 135–450)
PMV BLD AUTO: 8.3 FL (ref 5–10.5)
POTASSIUM SERPL-SCNC: 4.4 MMOL/L (ref 3.5–5.1)
RBC # BLD: 4.59 M/UL (ref 4.2–5.9)
SODIUM BLD-SCNC: 139 MMOL/L (ref 136–145)
TOTAL PROTEIN: 7.5 G/DL (ref 6.4–8.2)
TRIGL SERPL-MCNC: 445 MG/DL (ref 0–150)
TSH SERPL DL<=0.05 MIU/L-ACNC: 4.93 UIU/ML (ref 0.27–4.2)
VLDLC SERPL CALC-MCNC: ABNORMAL MG/DL
WBC # BLD: 8.2 K/UL (ref 4–11)

## 2018-01-12 PROCEDURE — 99395 PREV VISIT EST AGE 18-39: CPT | Performed by: INTERNAL MEDICINE

## 2018-01-12 PROCEDURE — 90471 IMMUNIZATION ADMIN: CPT | Performed by: INTERNAL MEDICINE

## 2018-01-12 PROCEDURE — 90686 IIV4 VACC NO PRSV 0.5 ML IM: CPT | Performed by: INTERNAL MEDICINE

## 2018-01-12 PROCEDURE — 36415 COLL VENOUS BLD VENIPUNCTURE: CPT | Performed by: INTERNAL MEDICINE

## 2018-01-12 ASSESSMENT — ENCOUNTER SYMPTOMS
ALLERGIC/IMMUNOLOGIC NEGATIVE: 1
EYES NEGATIVE: 1
RESPIRATORY NEGATIVE: 1
GASTROINTESTINAL NEGATIVE: 1

## 2018-01-12 NOTE — PATIENT INSTRUCTIONS
ll above problems reviewed and the found to be unchanged except for the following :     Annual Physical Exam. Flu shot. Umbilical Hernia Without Obstruction and Without Gangrene. Doing well s/p surgery   Essential Hypertension, Benign. Continue present meds. Home BP checks. Call if>140/90. Improve diet. Avoid caffeine and salt. Call if new c/o w/ meds. Pure Hypercholesterolemia. To continue to improve diet and lose weight. May need to add meds due to family hx. Will do labs. Depression With Anxiety. Continue to see Arslan and counselor. Continue meds. Alcohol Consumption Heavy. Much improved. Continue to moderate as discussed.

## 2018-01-12 NOTE — PROGRESS NOTES
Vaccine Information Sheet, \"Influenza - Inactivated\"  given to Zion Love, or parent/legal guardian of  Zino Love and verbalized understanding. Patient responses:    Have you ever had a reaction to a flu vaccine? No  Are you able to eat eggs without adverse effects? Yes  Do you have any current illness? No  Have you ever had [place] Syndrome? No    Flu vaccine given per order. Please see immunization tab.

## 2018-01-12 NOTE — PROGRESS NOTES
Subjective:      Patient ID: Atanacio Skiff is a 34 y.o. male. HPI   Annual physical exam  No new c/o. Essential hypertension, benign  No change in meds, no c/o with meds, no chest pain, SOB, palpatations, or syncope. Home bp was 120s/80s. Pure hypercholesterolemia  Wt up some. Diet fair. No c/o w/ meds. Labs show elevated Cholesterol and TGs. Depression with anxiety  Has improved w/ med. No recent change in meds or c/o. Sleeping better. Alcohol consumption heavy  Drinking 1-2 glasses wine/d. Increased TGs and MCV show ETOH effects in past. Much less consumption recently. Umbilical hernia without obstruction and without gangrene  S/p hernia repair and doing well. Annual physical exam  No new c/o. Review of Systems   Constitutional: Negative. HENT: Negative. Eyes: Negative. Respiratory: Negative. Cardiovascular: Negative. Gastrointestinal: Negative. Endocrine: Negative. Genitourinary: Negative. Musculoskeletal: Negative. Skin: Negative. Allergic/Immunologic: Negative. Neurological: Negative. Hematological: Negative. Psychiatric/Behavioral: Negative. Objective:   Physical Exam   Constitutional: He is oriented to person, place, and time. Vital signs are normal. He appears well-developed and well-nourished. Non-toxic appearance. He does not have a sickly appearance. He does not appear ill. No distress. HENT:   Head: Normocephalic and atraumatic. Right Ear: Hearing, tympanic membrane, external ear and ear canal normal.   Left Ear: Hearing, tympanic membrane, external ear and ear canal normal.   Nose: Nose normal. Right sinus exhibits no maxillary sinus tenderness and no frontal sinus tenderness. Left sinus exhibits no maxillary sinus tenderness and no frontal sinus tenderness. Mouth/Throat: Uvula is midline, oropharynx is clear and moist and mucous membranes are normal. No oropharyngeal exudate.    Eyes: Conjunctivae, EOM and lids are normal. pulsatile midline mass and no mass. There is no hepatosplenomegaly. There is no tenderness. There is no rebound, no guarding and no CVA tenderness. No hernia. Hernia confirmed negative in the ventral area, confirmed negative in the right inguinal area and confirmed negative in the left inguinal area. Genitourinary: Rectum normal, prostate normal, testes normal and penis normal. Rectal exam shows guaiac negative stool. Cremasteric reflex is present. No penile tenderness. Musculoskeletal: Normal range of motion. He exhibits no edema, tenderness or deformity. Lymphadenopathy:        Head (right side): No submental, no submandibular, no tonsillar, no preauricular, no posterior auricular and no occipital adenopathy present. Head (left side): No submental, no submandibular, no tonsillar, no preauricular, no posterior auricular and no occipital adenopathy present. He has no cervical adenopathy. He has no axillary adenopathy. Right: No inguinal, no supraclavicular and no epitrochlear adenopathy present. Left: No inguinal, no supraclavicular and no epitrochlear adenopathy present. Neurological: He is alert and oriented to person, place, and time. He has normal strength and normal reflexes. He is not disoriented. He displays no atrophy and no tremor. No cranial nerve deficit or sensory deficit. He exhibits normal muscle tone. He displays a negative Romberg sign. He displays no seizure activity. Coordination and gait normal.   Reflex Scores:       Tricep reflexes are 2+ on the right side and 2+ on the left side. Bicep reflexes are 2+ on the right side and 2+ on the left side. Brachioradialis reflexes are 2+ on the right side and 2+ on the left side. Patellar reflexes are 2+ on the right side and 2+ on the left side. Achilles reflexes are 2+ on the right side and 2+ on the left side. Skin: Skin is warm, dry and intact. No abrasion and no rash noted.  He is not

## 2018-01-15 ENCOUNTER — INITIAL CONSULT (OUTPATIENT)
Dept: PSYCHIATRY | Age: 33
End: 2018-01-15

## 2018-01-15 ENCOUNTER — TELEPHONE (OUTPATIENT)
Dept: FAMILY MEDICINE CLINIC | Age: 33
End: 2018-01-15

## 2018-01-15 VITALS
HEART RATE: 94 BPM | BODY MASS INDEX: 29.69 KG/M2 | OXYGEN SATURATION: 98 % | HEIGHT: 73 IN | DIASTOLIC BLOOD PRESSURE: 94 MMHG | SYSTOLIC BLOOD PRESSURE: 138 MMHG | WEIGHT: 224 LBS

## 2018-01-15 DIAGNOSIS — R79.89 ABNORMAL THYROID BLOOD TEST: ICD-10-CM

## 2018-01-15 DIAGNOSIS — E78.00 PURE HYPERCHOLESTEROLEMIA: Primary | ICD-10-CM

## 2018-01-15 DIAGNOSIS — F33.42 RECURRENT MAJOR DEPRESSIVE DISORDER, IN FULL REMISSION (HCC): Primary | ICD-10-CM

## 2018-01-15 DIAGNOSIS — F90.0 ATTENTION DEFICIT HYPERACTIVITY DISORDER (ADHD), PREDOMINANTLY INATTENTIVE TYPE: ICD-10-CM

## 2018-01-15 DIAGNOSIS — F40.10 SOCIAL ANXIETY DISORDER: ICD-10-CM

## 2018-01-15 LAB — HIV-1 AND HIV-2 ANTIBODIES: NORMAL

## 2018-01-15 PROCEDURE — 99214 OFFICE O/P EST MOD 30 MIN: CPT | Performed by: PSYCHIATRY & NEUROLOGY

## 2018-01-15 RX ORDER — BUPROPION HYDROCHLORIDE 150 MG/1
450 TABLET ORAL EVERY MORNING
Qty: 270 TABLET | Refills: 3 | Status: SHIPPED | OUTPATIENT
Start: 2018-01-15 | End: 2018-06-11 | Stop reason: SDUPTHER

## 2018-01-15 RX ORDER — EZETIMIBE 10 MG/1
10 TABLET ORAL DAILY
Qty: 30 TABLET | Refills: 3 | Status: SHIPPED | OUTPATIENT
Start: 2018-01-15 | End: 2018-02-14 | Stop reason: SDUPTHER

## 2018-01-15 ASSESSMENT — ENCOUNTER SYMPTOMS
RESPIRATORY NEGATIVE: 1
GASTROINTESTINAL NEGATIVE: 1
EYES NEGATIVE: 1

## 2018-01-15 NOTE — PROGRESS NOTES
Current Medications:   Current Outpatient Prescriptions   Medication Sig Dispense Refill    buPROPion (WELLBUTRIN XL) 150 MG extended release tablet Take 3 tablets by mouth every morning 270 tablet 3    fenofibrate 160 MG tablet Take 1 tablet by mouth daily 90 tablet 1    amLODIPine-benazepril (LOTREL) 5-20 MG per capsule Take 1 capsule by mouth daily 90 capsule 1    rosuvastatin (CRESTOR) 40 MG tablet Take 1 tablet by mouth daily 90 tablet 1    fenofibrate 160 MG tablet TAKE 1 TABLET BY MOUTH DAILY 30 tablet 5    traZODone (DESYREL) 50 MG tablet Take 1 tablet by mouth nightly as needed for Sleep 30 tablet 5    propranolol (INDERAL) 10 MG tablet Take 1 tablet by mouth daily as needed (anxiety) Take 1-2 tabs (10-20mg) 30min prior to anxiety provoking situation. 60 tablet 3    naproxen (NAPROSYN) 500 MG tablet Take 1 tablet by mouth 2 times daily (with meals). 60 tablet 3     No current facility-administered medications for this visit.         Labs/Imaging/EKG:    Orders Only on 01/12/2018   Component Date Value Ref Range Status    LDL Direct 01/12/2018 123* <100 mg/dL Final   Office Visit on 01/12/2018   Component Date Value Ref Range Status    Sodium 01/12/2018 139  136 - 145 mmol/L Final    Potassium 01/12/2018 4.4  3.5 - 5.1 mmol/L Final    Chloride 01/12/2018 98* 99 - 110 mmol/L Final    CO2 01/12/2018 26  21 - 32 mmol/L Final    Anion Gap 01/12/2018 15  3 - 16 Final    Glucose 01/12/2018 85  70 - 99 mg/dL Final    BUN 01/12/2018 9  7 - 20 mg/dL Final    CREATININE 01/12/2018 0.8* 0.9 - 1.3 mg/dL Final    GFR Non- 01/12/2018 >60  >60 Final    GFR  01/12/2018 >60  >60 Final    Calcium 01/12/2018 10.0  8.3 - 10.6 mg/dL Final    Total Protein 01/12/2018 7.5  6.4 - 8.2 g/dL Final    Alb 01/12/2018 4.8  3.4 - 5.0 g/dL Final    Alkaline Phosphatase 01/12/2018 75  40 - 129 U/L Final    ALT 01/12/2018 35  10 - 40 U/L Final    AST 01/12/2018 41* 15 - 37 U/L

## 2018-01-29 ENCOUNTER — PATIENT MESSAGE (OUTPATIENT)
Dept: PSYCHIATRY | Age: 33
End: 2018-01-29

## 2018-01-31 ENCOUNTER — OFFICE VISIT (OUTPATIENT)
Dept: PSYCHOLOGY | Age: 33
End: 2018-01-31

## 2018-01-31 DIAGNOSIS — F90.0 ATTENTION DEFICIT HYPERACTIVITY DISORDER (ADHD), PREDOMINANTLY INATTENTIVE TYPE: ICD-10-CM

## 2018-01-31 DIAGNOSIS — F40.10 SOCIAL ANXIETY DISORDER: Primary | ICD-10-CM

## 2018-01-31 PROCEDURE — 90832 PSYTX W PT 30 MINUTES: CPT | Performed by: SOCIAL WORKER

## 2018-01-31 ASSESSMENT — PATIENT HEALTH QUESTIONNAIRE - PHQ9
4. FEELING TIRED OR HAVING LITTLE ENERGY: 1
SUM OF ALL RESPONSES TO PHQ QUESTIONS 1-9: 10
9. THOUGHTS THAT YOU WOULD BE BETTER OFF DEAD, OR OF HURTING YOURSELF: 0
1. LITTLE INTEREST OR PLEASURE IN DOING THINGS: 2
SUM OF ALL RESPONSES TO PHQ9 QUESTIONS 1 & 2: 3
3. TROUBLE FALLING OR STAYING ASLEEP: 1
2. FEELING DOWN, DEPRESSED OR HOPELESS: 1
5. POOR APPETITE OR OVEREATING: 0
7. TROUBLE CONCENTRATING ON THINGS, SUCH AS READING THE NEWSPAPER OR WATCHING TELEVISION: 2
8. MOVING OR SPEAKING SO SLOWLY THAT OTHER PEOPLE COULD HAVE NOTICED. OR THE OPPOSITE, BEING SO FIGETY OR RESTLESS THAT YOU HAVE BEEN MOVING AROUND A LOT MORE THAN USUAL: 0
10. IF YOU CHECKED OFF ANY PROBLEMS, HOW DIFFICULT HAVE THESE PROBLEMS MADE IT FOR YOU TO DO YOUR WORK, TAKE CARE OF THINGS AT HOME, OR GET ALONG WITH OTHER PEOPLE: 1
6. FEELING BAD ABOUT YOURSELF - OR THAT YOU ARE A FAILURE OR HAVE LET YOURSELF OR YOUR FAMILY DOWN: 3

## 2018-01-31 NOTE — PROGRESS NOTES
plan, Motivational Interviewing to determine importance and readiness for change, Discussed potential barriers to change and Supportive techniques      Pt Behavioral Change Plan:  1. See about getting more exercise in, especially when you are stressed and not feeling productive  2. I'll follow up with Dr. Cheryle Angles about medication for ADHD  3. ADHD is not an intellectual disorder, remember this :)  4.  Return to see Ghazala Varela in 6 weeks.

## 2018-01-31 NOTE — PATIENT INSTRUCTIONS
1.  See about getting more exercise in, especially when you are stressed and not feeling productive  2. I'll follow up with Dr. Glenda Andrews about medication for ADHD  3. ADHD is not an intellectual disorder, remember this :)  4.  Return to see May Pisano in 6 weeks.

## 2018-02-14 RX ORDER — EZETIMIBE 10 MG/1
10 TABLET ORAL DAILY
Qty: 90 TABLET | Refills: 3 | Status: SHIPPED | OUTPATIENT
Start: 2018-02-14 | End: 2018-09-18 | Stop reason: SDUPTHER

## 2018-02-26 DIAGNOSIS — F90.9 ATTENTION DEFICIT HYPERACTIVITY DISORDER (ADHD), UNSPECIFIED ADHD TYPE: Primary | ICD-10-CM

## 2018-02-26 RX ORDER — DEXTROAMPHETAMINE SACCHARATE, AMPHETAMINE ASPARTATE MONOHYDRATE, DEXTROAMPHETAMINE SULFATE AND AMPHETAMINE SULFATE 2.5; 2.5; 2.5; 2.5 MG/1; MG/1; MG/1; MG/1
10 CAPSULE, EXTENDED RELEASE ORAL EVERY MORNING
Qty: 30 CAPSULE | Refills: 0 | Status: SHIPPED | OUTPATIENT
Start: 2018-02-26 | End: 2018-03-23 | Stop reason: SDUPTHER

## 2018-03-09 ENCOUNTER — NURSE ONLY (OUTPATIENT)
Dept: FAMILY MEDICINE CLINIC | Age: 33
End: 2018-03-09

## 2018-03-09 DIAGNOSIS — R79.89 ABNORMAL THYROID BLOOD TEST: ICD-10-CM

## 2018-03-09 DIAGNOSIS — E78.00 PURE HYPERCHOLESTEROLEMIA: ICD-10-CM

## 2018-03-09 LAB
CHOLESTEROL, TOTAL: 186 MG/DL (ref 0–199)
HDLC SERPL-MCNC: 70 MG/DL (ref 40–60)
LDL CHOLESTEROL CALCULATED: 65 MG/DL
T4 FREE: 1.1 NG/DL (ref 0.9–1.8)
TRIGL SERPL-MCNC: 254 MG/DL (ref 0–150)
TSH SERPL DL<=0.05 MIU/L-ACNC: 3.23 UIU/ML (ref 0.27–4.2)
VLDLC SERPL CALC-MCNC: 51 MG/DL

## 2018-03-09 PROCEDURE — 36415 COLL VENOUS BLD VENIPUNCTURE: CPT | Performed by: INTERNAL MEDICINE

## 2018-03-13 ENCOUNTER — OFFICE VISIT (OUTPATIENT)
Dept: PSYCHOLOGY | Age: 33
End: 2018-03-13

## 2018-03-13 DIAGNOSIS — F40.10 SOCIAL ANXIETY DISORDER: Primary | ICD-10-CM

## 2018-03-13 PROCEDURE — 90832 PSYTX W PT 30 MINUTES: CPT | Performed by: SOCIAL WORKER

## 2018-03-13 ASSESSMENT — PATIENT HEALTH QUESTIONNAIRE - PHQ9
5. POOR APPETITE OR OVEREATING: 2
SUM OF ALL RESPONSES TO PHQ9 QUESTIONS 1 & 2: 2
10. IF YOU CHECKED OFF ANY PROBLEMS, HOW DIFFICULT HAVE THESE PROBLEMS MADE IT FOR YOU TO DO YOUR WORK, TAKE CARE OF THINGS AT HOME, OR GET ALONG WITH OTHER PEOPLE: 1
SUM OF ALL RESPONSES TO PHQ QUESTIONS 1-9: 10
1. LITTLE INTEREST OR PLEASURE IN DOING THINGS: 1
2. FEELING DOWN, DEPRESSED OR HOPELESS: 1
4. FEELING TIRED OR HAVING LITTLE ENERGY: 1
8. MOVING OR SPEAKING SO SLOWLY THAT OTHER PEOPLE COULD HAVE NOTICED. OR THE OPPOSITE, BEING SO FIGETY OR RESTLESS THAT YOU HAVE BEEN MOVING AROUND A LOT MORE THAN USUAL: 0
3. TROUBLE FALLING OR STAYING ASLEEP: 1
7. TROUBLE CONCENTRATING ON THINGS, SUCH AS READING THE NEWSPAPER OR WATCHING TELEVISION: 2
6. FEELING BAD ABOUT YOURSELF - OR THAT YOU ARE A FAILURE OR HAVE LET YOURSELF OR YOUR FAMILY DOWN: 2
9. THOUGHTS THAT YOU WOULD BE BETTER OFF DEAD, OR OF HURTING YOURSELF: 0

## 2018-03-13 NOTE — PROGRESS NOTES
memory intact  Attention/Concentration    intact  Morbid ideation No  Suicide Assessment    no suicidal ideation      History:    Medications:   Current Outpatient Prescriptions   Medication Sig Dispense Refill    amphetamine-dextroamphetamine (ADDERALL XR) 10 MG extended release capsule Take 1 capsule by mouth every morning for 30 days. 30 capsule 0    ezetimibe (ZETIA) 10 MG tablet Take 1 tablet by mouth daily 90 tablet 3    buPROPion (WELLBUTRIN XL) 150 MG extended release tablet Take 3 tablets by mouth every morning 270 tablet 3    fenofibrate 160 MG tablet Take 1 tablet by mouth daily 90 tablet 1    amLODIPine-benazepril (LOTREL) 5-20 MG per capsule Take 1 capsule by mouth daily 90 capsule 1    rosuvastatin (CRESTOR) 40 MG tablet Take 1 tablet by mouth daily 90 tablet 1    fenofibrate 160 MG tablet TAKE 1 TABLET BY MOUTH DAILY 30 tablet 5    traZODone (DESYREL) 50 MG tablet Take 1 tablet by mouth nightly as needed for Sleep 30 tablet 5    propranolol (INDERAL) 10 MG tablet Take 1 tablet by mouth daily as needed (anxiety) Take 1-2 tabs (10-20mg) 30min prior to anxiety provoking situation. 60 tablet 3    naproxen (NAPROSYN) 500 MG tablet Take 1 tablet by mouth 2 times daily (with meals). 60 tablet 3     No current facility-administered medications for this visit. Social History:   Social History     Social History    Marital status:      Spouse name: N/A    Number of children: N/A    Years of education: N/A     Occupational History    Not on file.      Social History Main Topics    Smoking status: Former Smoker     Packs/day: 0.50     Years: 9.00     Types: Cigarettes     Quit date: 8/28/2011    Smokeless tobacco: Current User     Types: Chew    Alcohol use 3.6 oz/week     2 Glasses of wine, 2 Cans of beer, 2 Shots of liquor per week      Comment: daily    Drug use: No    Sexual activity: Not on file     Other Topics Concern    Not on file     Social History Narrative    No

## 2018-03-23 DIAGNOSIS — F90.9 ATTENTION DEFICIT HYPERACTIVITY DISORDER (ADHD), UNSPECIFIED ADHD TYPE: ICD-10-CM

## 2018-03-23 RX ORDER — DEXTROAMPHETAMINE SACCHARATE, AMPHETAMINE ASPARTATE MONOHYDRATE, DEXTROAMPHETAMINE SULFATE AND AMPHETAMINE SULFATE 2.5; 2.5; 2.5; 2.5 MG/1; MG/1; MG/1; MG/1
10 CAPSULE, EXTENDED RELEASE ORAL EVERY MORNING
Qty: 30 CAPSULE | Refills: 0 | Status: SHIPPED | OUTPATIENT
Start: 2018-03-23 | End: 2018-04-02 | Stop reason: SDUPTHER

## 2018-04-02 DIAGNOSIS — F90.9 ATTENTION DEFICIT HYPERACTIVITY DISORDER (ADHD), UNSPECIFIED ADHD TYPE: ICD-10-CM

## 2018-04-02 RX ORDER — DEXTROAMPHETAMINE SACCHARATE, AMPHETAMINE ASPARTATE MONOHYDRATE, DEXTROAMPHETAMINE SULFATE AND AMPHETAMINE SULFATE 2.5; 2.5; 2.5; 2.5 MG/1; MG/1; MG/1; MG/1
10 CAPSULE, EXTENDED RELEASE ORAL EVERY MORNING
Qty: 30 CAPSULE | Refills: 0 | Status: SHIPPED | OUTPATIENT
Start: 2018-04-02 | End: 2018-04-16

## 2018-04-16 ENCOUNTER — INITIAL CONSULT (OUTPATIENT)
Dept: PSYCHIATRY | Age: 33
End: 2018-04-16

## 2018-04-16 VITALS
DIASTOLIC BLOOD PRESSURE: 84 MMHG | HEART RATE: 94 BPM | HEIGHT: 73 IN | SYSTOLIC BLOOD PRESSURE: 130 MMHG | OXYGEN SATURATION: 98 % | BODY MASS INDEX: 28.63 KG/M2 | WEIGHT: 216 LBS

## 2018-04-16 DIAGNOSIS — F33.42 RECURRENT MAJOR DEPRESSIVE DISORDER, IN FULL REMISSION (HCC): Primary | ICD-10-CM

## 2018-04-16 DIAGNOSIS — F90.0 ATTENTION DEFICIT HYPERACTIVITY DISORDER (ADHD), PREDOMINANTLY INATTENTIVE TYPE: ICD-10-CM

## 2018-04-16 PROCEDURE — 99214 OFFICE O/P EST MOD 30 MIN: CPT | Performed by: PSYCHIATRY & NEUROLOGY

## 2018-04-16 ASSESSMENT — ENCOUNTER SYMPTOMS
RESPIRATORY NEGATIVE: 1
GASTROINTESTINAL NEGATIVE: 1
EYES NEGATIVE: 1

## 2018-04-26 ENCOUNTER — OFFICE VISIT (OUTPATIENT)
Dept: PSYCHOLOGY | Age: 33
End: 2018-04-26

## 2018-04-26 DIAGNOSIS — F06.31 DEPRESSIVE DISORDER DUE TO ANOTHER MEDICAL CONDITION WITH DEPRESSIVE FEATURES: Primary | ICD-10-CM

## 2018-04-26 PROCEDURE — 90832 PSYTX W PT 30 MINUTES: CPT | Performed by: SOCIAL WORKER

## 2018-04-26 ASSESSMENT — PATIENT HEALTH QUESTIONNAIRE - PHQ9
5. POOR APPETITE OR OVEREATING: 0
1. LITTLE INTEREST OR PLEASURE IN DOING THINGS: 1
3. TROUBLE FALLING OR STAYING ASLEEP: 1
4. FEELING TIRED OR HAVING LITTLE ENERGY: 1
9. THOUGHTS THAT YOU WOULD BE BETTER OFF DEAD, OR OF HURTING YOURSELF: 0
6. FEELING BAD ABOUT YOURSELF - OR THAT YOU ARE A FAILURE OR HAVE LET YOURSELF OR YOUR FAMILY DOWN: 2
SUM OF ALL RESPONSES TO PHQ9 QUESTIONS 1 & 2: 2
2. FEELING DOWN, DEPRESSED OR HOPELESS: 1
SUM OF ALL RESPONSES TO PHQ QUESTIONS 1-9: 7
10. IF YOU CHECKED OFF ANY PROBLEMS, HOW DIFFICULT HAVE THESE PROBLEMS MADE IT FOR YOU TO DO YOUR WORK, TAKE CARE OF THINGS AT HOME, OR GET ALONG WITH OTHER PEOPLE: 1
8. MOVING OR SPEAKING SO SLOWLY THAT OTHER PEOPLE COULD HAVE NOTICED. OR THE OPPOSITE, BEING SO FIGETY OR RESTLESS THAT YOU HAVE BEEN MOVING AROUND A LOT MORE THAN USUAL: 0
7. TROUBLE CONCENTRATING ON THINGS, SUCH AS READING THE NEWSPAPER OR WATCHING TELEVISION: 1

## 2018-05-07 RX ORDER — ROSUVASTATIN CALCIUM 40 MG/1
40 TABLET, COATED ORAL DAILY
Qty: 90 TABLET | Refills: 1 | Status: SHIPPED | OUTPATIENT
Start: 2018-05-07 | End: 2018-09-18 | Stop reason: SDUPTHER

## 2018-05-14 ENCOUNTER — INITIAL CONSULT (OUTPATIENT)
Dept: PSYCHIATRY | Age: 33
End: 2018-05-14

## 2018-05-14 VITALS
DIASTOLIC BLOOD PRESSURE: 60 MMHG | WEIGHT: 211.4 LBS | HEIGHT: 75 IN | SYSTOLIC BLOOD PRESSURE: 104 MMHG | BODY MASS INDEX: 26.28 KG/M2 | OXYGEN SATURATION: 97 % | HEART RATE: 91 BPM

## 2018-05-14 DIAGNOSIS — F33.42 RECURRENT MAJOR DEPRESSIVE DISORDER, IN FULL REMISSION (HCC): ICD-10-CM

## 2018-05-14 DIAGNOSIS — F90.0 ATTENTION DEFICIT HYPERACTIVITY DISORDER (ADHD), PREDOMINANTLY INATTENTIVE TYPE: Primary | ICD-10-CM

## 2018-05-14 PROCEDURE — 99213 OFFICE O/P EST LOW 20 MIN: CPT | Performed by: PSYCHIATRY & NEUROLOGY

## 2018-05-14 ASSESSMENT — ENCOUNTER SYMPTOMS
EYES NEGATIVE: 1
RESPIRATORY NEGATIVE: 1
GASTROINTESTINAL NEGATIVE: 1

## 2018-06-07 ENCOUNTER — OFFICE VISIT (OUTPATIENT)
Dept: PSYCHOLOGY | Age: 33
End: 2018-06-07

## 2018-06-07 DIAGNOSIS — F33.40 MDD (RECURRENT MAJOR DEPRESSIVE DISORDER) IN REMISSION (HCC): ICD-10-CM

## 2018-06-07 DIAGNOSIS — F90.0 ATTENTION DEFICIT HYPERACTIVITY DISORDER (ADHD), PREDOMINANTLY INATTENTIVE TYPE: Primary | ICD-10-CM

## 2018-06-07 PROCEDURE — 4004F PT TOBACCO SCREEN RCVD TLK: CPT | Performed by: SOCIAL WORKER

## 2018-06-07 PROCEDURE — 90832 PSYTX W PT 30 MINUTES: CPT | Performed by: SOCIAL WORKER

## 2018-06-07 PROCEDURE — 99999 PR OFFICE/OUTPT VISIT,PROCEDURE ONLY: CPT | Performed by: SOCIAL WORKER

## 2018-06-07 ASSESSMENT — PATIENT HEALTH QUESTIONNAIRE - PHQ9
1. LITTLE INTEREST OR PLEASURE IN DOING THINGS: 1
8. MOVING OR SPEAKING SO SLOWLY THAT OTHER PEOPLE COULD HAVE NOTICED. OR THE OPPOSITE, BEING SO FIGETY OR RESTLESS THAT YOU HAVE BEEN MOVING AROUND A LOT MORE THAN USUAL: 0
2. FEELING DOWN, DEPRESSED OR HOPELESS: 0
9. THOUGHTS THAT YOU WOULD BE BETTER OFF DEAD, OR OF HURTING YOURSELF: 0
6. FEELING BAD ABOUT YOURSELF - OR THAT YOU ARE A FAILURE OR HAVE LET YOURSELF OR YOUR FAMILY DOWN: 2
10. IF YOU CHECKED OFF ANY PROBLEMS, HOW DIFFICULT HAVE THESE PROBLEMS MADE IT FOR YOU TO DO YOUR WORK, TAKE CARE OF THINGS AT HOME, OR GET ALONG WITH OTHER PEOPLE: 1
SUM OF ALL RESPONSES TO PHQ QUESTIONS 1-9: 6
SUM OF ALL RESPONSES TO PHQ9 QUESTIONS 1 & 2: 1
3. TROUBLE FALLING OR STAYING ASLEEP: 1
4. FEELING TIRED OR HAVING LITTLE ENERGY: 1
7. TROUBLE CONCENTRATING ON THINGS, SUCH AS READING THE NEWSPAPER OR WATCHING TELEVISION: 1
5. POOR APPETITE OR OVEREATING: 0

## 2018-06-11 ENCOUNTER — INITIAL CONSULT (OUTPATIENT)
Dept: PSYCHIATRY | Age: 33
End: 2018-06-11

## 2018-06-11 VITALS — WEIGHT: 212 LBS | DIASTOLIC BLOOD PRESSURE: 83 MMHG | SYSTOLIC BLOOD PRESSURE: 124 MMHG | BODY MASS INDEX: 26.32 KG/M2

## 2018-06-11 DIAGNOSIS — F33.42 RECURRENT MAJOR DEPRESSIVE DISORDER, IN FULL REMISSION (HCC): ICD-10-CM

## 2018-06-11 DIAGNOSIS — F90.0 ATTENTION DEFICIT HYPERACTIVITY DISORDER (ADHD), PREDOMINANTLY INATTENTIVE TYPE: Primary | ICD-10-CM

## 2018-06-11 PROCEDURE — G8428 CUR MEDS NOT DOCUMENT: HCPCS | Performed by: PSYCHIATRY & NEUROLOGY

## 2018-06-11 PROCEDURE — 99213 OFFICE O/P EST LOW 20 MIN: CPT | Performed by: PSYCHIATRY & NEUROLOGY

## 2018-06-11 PROCEDURE — 4004F PT TOBACCO SCREEN RCVD TLK: CPT | Performed by: PSYCHIATRY & NEUROLOGY

## 2018-06-11 PROCEDURE — G8417 CALC BMI ABV UP PARAM F/U: HCPCS | Performed by: PSYCHIATRY & NEUROLOGY

## 2018-06-11 RX ORDER — BUPROPION HYDROCHLORIDE 150 MG/1
450 TABLET ORAL EVERY MORNING
Qty: 270 TABLET | Refills: 3 | Status: SHIPPED | OUTPATIENT
Start: 2018-06-11 | End: 2018-09-18 | Stop reason: SDUPTHER

## 2018-06-11 ASSESSMENT — ENCOUNTER SYMPTOMS
RESPIRATORY NEGATIVE: 1
EYES NEGATIVE: 1
GASTROINTESTINAL NEGATIVE: 1

## 2018-06-21 ENCOUNTER — OFFICE VISIT (OUTPATIENT)
Dept: DERMATOLOGY | Age: 33
End: 2018-06-21

## 2018-06-21 DIAGNOSIS — Z87.891 FORMER SMOKER: ICD-10-CM

## 2018-06-21 DIAGNOSIS — D48.9 NEOPLASM OF UNCERTAIN BEHAVIOR: Primary | ICD-10-CM

## 2018-06-21 PROCEDURE — 11100 PR BIOPSY OF SKIN LESION: CPT | Performed by: DERMATOLOGY

## 2018-06-28 ENCOUNTER — TELEPHONE (OUTPATIENT)
Dept: DERMATOLOGY | Age: 33
End: 2018-06-28

## 2018-07-11 DIAGNOSIS — F90.0 ATTENTION DEFICIT HYPERACTIVITY DISORDER (ADHD), PREDOMINANTLY INATTENTIVE TYPE: ICD-10-CM

## 2018-07-13 ENCOUNTER — TELEPHONE (OUTPATIENT)
Dept: FAMILY MEDICINE CLINIC | Age: 33
End: 2018-07-13

## 2018-07-13 ENCOUNTER — OFFICE VISIT (OUTPATIENT)
Dept: FAMILY MEDICINE CLINIC | Age: 33
End: 2018-07-13

## 2018-07-13 VITALS
RESPIRATION RATE: 16 BRPM | WEIGHT: 208 LBS | DIASTOLIC BLOOD PRESSURE: 68 MMHG | HEIGHT: 73 IN | HEART RATE: 86 BPM | BODY MASS INDEX: 27.57 KG/M2 | OXYGEN SATURATION: 98 % | SYSTOLIC BLOOD PRESSURE: 100 MMHG

## 2018-07-13 DIAGNOSIS — M72.2 PLANTAR FASCIITIS OF LEFT FOOT: ICD-10-CM

## 2018-07-13 DIAGNOSIS — E78.00 PURE HYPERCHOLESTEROLEMIA: ICD-10-CM

## 2018-07-13 DIAGNOSIS — I10 ESSENTIAL HYPERTENSION, BENIGN: ICD-10-CM

## 2018-07-13 DIAGNOSIS — F90.0 ATTENTION DEFICIT HYPERACTIVITY DISORDER (ADHD), PREDOMINANTLY INATTENTIVE TYPE: ICD-10-CM

## 2018-07-13 DIAGNOSIS — Z78.9 ALCOHOL CONSUMPTION HEAVY: ICD-10-CM

## 2018-07-13 DIAGNOSIS — F41.8 DEPRESSION WITH ANXIETY: ICD-10-CM

## 2018-07-13 PROCEDURE — 4004F PT TOBACCO SCREEN RCVD TLK: CPT | Performed by: INTERNAL MEDICINE

## 2018-07-13 PROCEDURE — 99214 OFFICE O/P EST MOD 30 MIN: CPT | Performed by: INTERNAL MEDICINE

## 2018-07-13 PROCEDURE — G8427 DOCREV CUR MEDS BY ELIG CLIN: HCPCS | Performed by: INTERNAL MEDICINE

## 2018-07-13 PROCEDURE — G8417 CALC BMI ABV UP PARAM F/U: HCPCS | Performed by: INTERNAL MEDICINE

## 2018-07-13 RX ORDER — AMLODIPINE BESYLATE AND BENAZEPRIL HYDROCHLORIDE 5; 20 MG/1; MG/1
1 CAPSULE ORAL DAILY
Qty: 90 CAPSULE | Refills: 1 | Status: SHIPPED | OUTPATIENT
Start: 2018-07-13 | End: 2018-09-18 | Stop reason: SDUPTHER

## 2018-07-13 RX ORDER — AMLODIPINE BESYLATE AND BENAZEPRIL HYDROCHLORIDE 5; 10 MG/1; MG/1
1 CAPSULE ORAL DAILY
Qty: 90 CAPSULE | Refills: 3 | Status: SHIPPED | OUTPATIENT
Start: 2018-07-13 | End: 2020-05-22 | Stop reason: SDUPTHER

## 2018-07-13 ASSESSMENT — ENCOUNTER SYMPTOMS
RESPIRATORY NEGATIVE: 1
ALLERGIC/IMMUNOLOGIC NEGATIVE: 1
GASTROINTESTINAL NEGATIVE: 1

## 2018-07-13 NOTE — ASSESSMENT & PLAN NOTE
Drinking 4-6 beers on week end day, none during week. Increased TGs and MCV show ETOH effects in past. Much less consumption recently.

## 2018-07-13 NOTE — ASSESSMENT & PLAN NOTE
No change in meds, no c/o with meds, no chest pain, SOB, palpatations, or syncope. Home bp was 120s/80s.

## 2018-07-13 NOTE — TELEPHONE ENCOUNTER
Patient needs a letter from Dr Yun Gong so Dr Claire Sites can take over the ADD medications per Dr Claire Sites  Please put letter in the system and route to Dr Dakotah Espinosa and let pt know when its done

## 2018-07-13 NOTE — PATIENT INSTRUCTIONS
All above problems reviewed and the found to be unchanged except for the following :     Attention Deficit Hyperactivity Disorder (Adhd), Predominantly Inattentive Type. Continue med per Psych. May transfer if ok w/ Psych. Essential Hypertension, Benign. Continue present meds but decrease Amlodipine-Benazipril to 5/10 mg. Home BP checks. Call if>140/90. Improve diet. Avoid caffeine and salt. Call if new c/o w/ meds. Pure Hypercholesterolemia. Continue diet and wt loss. Goal is <195 lbs. Continue meds. Will do labs w/ H&P in 6 mo. Depression With Anxiety. Continue to see Psychiatrist.      Plantar Fasciitis of Left Foot. Ice, stretch, and Aleve as discussed. To Podiatrist if continues. Alcohol Consumption Heavy. Continue to maintain present usage or less. Call if needs further assistance.

## 2018-07-13 NOTE — PROGRESS NOTES
Subjective:      Patient ID: Elgin Eric is a 28 y.o. male. HPI  Essential hypertension, benign  No change in meds, no c/o with meds, no chest pain, SOB, palpatations, or syncope. Home bp was 120s/80s. Pure hypercholesterolemia  Wt up some. Diet fair. No c/o w/ meds. Labs show elevated Cholesterol and TGs. Depression with anxiety  Has improved w/ med. No recent change in meds or c/o. Sleeping better. Alcohol consumption heavy  Drinking 4-6 beers on week end day, none during week. Increased TGs and MCV show ETOH effects in past. Much less consumption recently. Attention deficit hyperactivity disorder (ADHD), predominantly inattentive type  Dx by Psych and started on Vyvance and doing better. Plantar fasciitis of left foot  Started 2 mo ago w/ pain first in am worse after walking distance. No tx yet. Past Medical History:   Diagnosis Date    Chronic back pain     Hyperlipidemia     Hypertension     Prolonged emergence from general anesthesia     Umbilical hernia      Current Outpatient Prescriptions   Medication Sig Dispense Refill    rosuvastatin (CRESTOR) 40 MG tablet TAKE 1 TABLET BY MOUTH  DAILY 90 tablet 1    ezetimibe (ZETIA) 10 MG tablet Take 1 tablet by mouth daily 90 tablet 3    fenofibrate 160 MG tablet Take 1 tablet by mouth daily 90 tablet 1    amLODIPine-benazepril (LOTREL) 5-20 MG per capsule Take 1 capsule by mouth daily 90 capsule 1    propranolol (INDERAL) 10 MG tablet Take 1 tablet by mouth daily as needed (anxiety) Take 1-2 tabs (10-20mg) 30min prior to anxiety provoking situation. 60 tablet 3    lisdexamfetamine (VYVANSE) 50 MG capsule Take 1 capsule by mouth every morning for 30 days. . 30 capsule 0    buPROPion (WELLBUTRIN XL) 150 MG extended release tablet Take 3 tablets by mouth every morning 270 tablet 3    traZODone (DESYREL) 50 MG tablet Take 1 tablet by mouth nightly as needed for Sleep 30 tablet 5     No current facility-administered medications for this visit. No Known Allergies  Social History   Substance Use Topics    Smoking status: Former Smoker     Packs/day: 0.50     Years: 9.00     Types: Cigarettes     Quit date: 8/28/2011    Smokeless tobacco: Current User     Types: Chew    Alcohol use 3.6 oz/week     2 Glasses of wine, 2 Cans of beer, 2 Shots of liquor per week      Comment: daily     Family History   Problem Relation Age of Onset    High Blood Pressure Father     High Cholesterol Father     Asthma Brother     Cancer Paternal Grandfather     High Blood Pressure Paternal Grandfather     High Cholesterol Paternal Grandfather     Diabetes Neg Hx     Emphysema Neg Hx     Heart Failure Neg Hx     Hypertension Neg Hx        Review of Systems   Constitutional: Negative. Respiratory: Negative. Cardiovascular: Negative. Gastrointestinal: Negative. Endocrine: Negative. Genitourinary: Negative. Musculoskeletal: Negative. Skin: Negative. Allergic/Immunologic: Negative. Neurological: Negative. Hematological: Negative. Psychiatric/Behavioral: Negative. Vitals:    07/13/18 0807 07/13/18 0837   BP: 102/66 100/68   Pulse: 86    Resp: 16    SpO2: 98%    Weight: 208 lb (94.3 kg)    Height: 6' 1\" (1.854 m)        Objective:   Physical Exam   Constitutional: He is oriented to person, place, and time. Vital signs are normal. He appears well-developed and well-nourished. No distress. HENT:   Head: Normocephalic and atraumatic. Eyes: Conjunctivae and EOM are normal. Pupils are equal, round, and reactive to light. Neck: Trachea normal, normal range of motion, full passive range of motion without pain and phonation normal. Neck supple. Normal carotid pulses, no hepatojugular reflux and no JVD present. Carotid bruit is not present. No thyroid mass and no thyromegaly present. Cardiovascular: Normal rate, regular rhythm, normal heart sounds, intact distal pulses and normal pulses. No extrasystoles are present.  PMI is not displaced. Exam reveals no gallop and no friction rub. No murmur heard. Pulses:       Carotid pulses are 2+ on the right side, and 2+ on the left side. Radial pulses are 2+ on the right side, and 2+ on the left side. Femoral pulses are 2+ on the right side, and 2+ on the left side. Popliteal pulses are 2+ on the right side, and 2+ on the left side. Dorsalis pedis pulses are 2+ on the right side, and 2+ on the left side. Posterior tibial pulses are 2+ on the right side, and 2+ on the left side. Pulmonary/Chest: Effort normal and breath sounds normal. No accessory muscle usage. No apnea, no tachypnea and no bradypnea. No respiratory distress. He has no decreased breath sounds. He has no wheezes. He has no rhonchi. He has no rales. Abdominal: Normal appearance and normal aorta. There is no hepatosplenomegaly. There is no CVA tenderness. No hernia. Hernia confirmed negative in the ventral area. Musculoskeletal: Normal range of motion. He exhibits tenderness (L HEEL pain. ). Neurological: He is alert and oriented to person, place, and time. He has normal strength. He is not disoriented. He displays no atrophy and no tremor. No cranial nerve deficit or sensory deficit. He exhibits normal muscle tone. He displays a negative Romberg sign. Coordination normal.   Skin: Skin is warm, dry and intact. No abrasion and no rash noted. He is not diaphoretic. No cyanosis. No pallor. Nails show no clubbing. Psychiatric: His speech is normal and behavior is normal. Judgment and thought content normal. His mood appears anxious. His affect is not angry, not blunt, not labile and not inappropriate. Cognition and memory are normal. He exhibits a depressed mood. Assessment:      Problem   Attention Deficit Hyperactivity Disorder (Adhd), Predominantly Inattentive Type. Doing well w/ med. Essential Hypertension, Benign. BP low and having some dizziness. Pure Hypercholesterolemia. Good wt loss. Depression With Anxiety. Stable w/ meds. Plantar Fasciitis of Left Foot. New. Alcohol Consumption Heavy. Doing well w/ controlled weekend use only. Plan:      All above problems reviewed and the found to be unchanged except for the following :     Attention Deficit Hyperactivity Disorder (Adhd), Predominantly Inattentive Type. Continue med per Psych. May transfer if ok w/ Psych. Essential Hypertension, Benign. Continue present meds but decrease Amlodipine-Benazipril to 5/10 mg. Home BP checks. Call if>140/90. Improve diet. Avoid caffeine and salt. Call if new c/o w/ meds. Pure Hypercholesterolemia. Continue diet and wt loss. Goal is <195 lbs. Continue meds. Will do labs w/ H&P in 6 mo. Depression With Anxiety. Continue to see Psychiatrist.      Plantar Fasciitis of Left Foot. Ice, stretch, and Aleve as discussed. To Podiatrist if continues. Alcohol Consumption Heavy. Continue to maintain present usage or less. Call if needs further assistance.

## 2018-07-16 DIAGNOSIS — F90.0 ATTENTION DEFICIT HYPERACTIVITY DISORDER (ADHD), PREDOMINANTLY INATTENTIVE TYPE: ICD-10-CM

## 2018-07-16 NOTE — TELEPHONE ENCOUNTER
Working on it. Trying to determine what exactly Dr. Huffman Hircarol wants. Charting shows this is my rec.  Ines Mustafa My charted pt and gave rf. thanks

## 2018-07-26 ENCOUNTER — PROCEDURE VISIT (OUTPATIENT)
Dept: DERMATOLOGY | Age: 33
End: 2018-07-26

## 2018-07-26 VITALS
SYSTOLIC BLOOD PRESSURE: 82 MMHG | HEART RATE: 101 BPM | BODY MASS INDEX: 26.78 KG/M2 | DIASTOLIC BLOOD PRESSURE: 62 MMHG | WEIGHT: 203 LBS

## 2018-07-26 DIAGNOSIS — D23.9 DYSPLASTIC NEVUS: Primary | ICD-10-CM

## 2018-07-26 PROCEDURE — 11402 EXC TR-EXT B9+MARG 1.1-2 CM: CPT | Performed by: DERMATOLOGY

## 2018-07-26 PROCEDURE — 12032 INTMD RPR S/A/T/EXT 2.6-7.5: CPT | Performed by: DERMATOLOGY

## 2018-07-26 NOTE — PROGRESS NOTES
Dispense Refill    lisdexamfetamine (VYVANSE) 50 MG capsule Take 1 capsule by mouth every morning for 30 days. . 30 capsule 0    amLODIPine-benazepril (LOTREL) 5-20 MG per capsule Take 1 capsule by mouth daily 90 capsule 1    amLODIPine-benazepril (LOTREL) 5-10 MG per capsule Take 1 capsule by mouth daily 90 capsule 3    buPROPion (WELLBUTRIN XL) 150 MG extended release tablet Take 3 tablets by mouth every morning 270 tablet 3    rosuvastatin (CRESTOR) 40 MG tablet TAKE 1 TABLET BY MOUTH  DAILY 90 tablet 1    ezetimibe (ZETIA) 10 MG tablet Take 1 tablet by mouth daily 90 tablet 3    fenofibrate 160 MG tablet Take 1 tablet by mouth daily 90 tablet 1    traZODone (DESYREL) 50 MG tablet Take 1 tablet by mouth nightly as needed for Sleep 30 tablet 5    propranolol (INDERAL) 10 MG tablet Take 1 tablet by mouth daily as needed (anxiety) Take 1-2 tabs (10-20mg) 30min prior to anxiety provoking situation. 60 tablet 3       Social History:   Social History     Social History    Marital status:      Spouse name: N/A    Number of children: N/A    Years of education: N/A     Occupational History    Not on file. Social History Main Topics    Smoking status: Former Smoker     Packs/day: 0.50     Years: 9.00     Types: Cigarettes     Quit date: 8/28/2011    Smokeless tobacco: Current User     Types: Chew    Alcohol use 3.6 oz/week     2 Glasses of wine, 2 Cans of beer, 2 Shots of liquor per week      Comment: daily    Drug use: No    Sexual activity: Not on file     Other Topics Concern    Not on file     Social History Narrative    No narrative on file       Physical Examination     Well appearing. BP: 101/66  1. Right paraspinal superior back- 1.1cm pink patch at site of biopsied dysplastic nevus              Assessment and Plan     1. Dysplastic nevus        1. Dysplastic nevus  -Informed written consent obtained after risks (bleeding, infection, scar, discomfort) and benefits explained.    -Area

## 2018-07-31 ENCOUNTER — TELEPHONE (OUTPATIENT)
Dept: DERMATOLOGY | Age: 33
End: 2018-07-31

## 2018-07-31 NOTE — TELEPHONE ENCOUNTER
Spoke with patient to inform him/her of biopsy result(s) from 7/26/18.      1. Location: right paraspinal superior back      Result: focal residual dysplastic nevus, completely excised      Plan: NFT

## 2018-08-21 ENCOUNTER — TELEPHONE (OUTPATIENT)
Dept: FAMILY MEDICINE CLINIC | Age: 33
End: 2018-08-21

## 2018-08-21 DIAGNOSIS — F90.0 ATTENTION DEFICIT HYPERACTIVITY DISORDER (ADHD), PREDOMINANTLY INATTENTIVE TYPE: ICD-10-CM

## 2018-08-21 NOTE — TELEPHONE ENCOUNTER
Patient called regarding the Vyvanse prescription. I advised him of the nurse note regarding medication contract, signing it and receiving a prescription. He agrees to come in and sign the contract and receive the prescription. No appointment needed for this correct? Let him know as soon as possible when contract is ready for him to come in and sign.

## 2018-09-18 DIAGNOSIS — F90.0 ATTENTION DEFICIT HYPERACTIVITY DISORDER (ADHD), PREDOMINANTLY INATTENTIVE TYPE: ICD-10-CM

## 2018-09-18 RX ORDER — ROSUVASTATIN CALCIUM 40 MG/1
40 TABLET, COATED ORAL DAILY
Qty: 90 TABLET | Refills: 1 | Status: SHIPPED | OUTPATIENT
Start: 2018-09-18 | End: 2019-04-13 | Stop reason: SDUPTHER

## 2018-09-18 RX ORDER — AMLODIPINE BESYLATE AND BENAZEPRIL HYDROCHLORIDE 5; 20 MG/1; MG/1
1 CAPSULE ORAL DAILY
Qty: 90 CAPSULE | Refills: 1 | Status: SHIPPED | OUTPATIENT
Start: 2018-09-18 | End: 2019-02-01 | Stop reason: DRUGHIGH

## 2018-09-18 RX ORDER — EZETIMIBE 10 MG/1
10 TABLET ORAL DAILY
Qty: 90 TABLET | Refills: 1 | Status: SHIPPED | OUTPATIENT
Start: 2018-09-18 | End: 2019-04-13 | Stop reason: SDUPTHER

## 2018-09-18 RX ORDER — BUPROPION HYDROCHLORIDE 150 MG/1
450 TABLET ORAL EVERY MORNING
Qty: 270 TABLET | Refills: 1 | Status: SHIPPED | OUTPATIENT
Start: 2018-09-18 | End: 2019-02-01 | Stop reason: DRUGHIGH

## 2018-09-18 NOTE — TELEPHONE ENCOUNTER
Mirtha Mcmanus [phone] (home)    is requesting refill(s) of medication Rosuvastatin to preferred pharmacy [facility] on [address] [facility] 7/13/18 (pertaining to medication)   Last refill 5/7/18 (per medication requested)  Next office visit scheduled or attempted Yes  Date 2/1/19  If No, reason     Mirtha Mcmanus [phone] (home)    is requesting refill(s) of medication Lotrel 5-10 MG to preferred pharmacy [facility] on [address] [facility] 7/13/18 (pertaining to medication)   Last refill 7/13/18 (per medication requested)  Next office visit scheduled or attempted Yes  Date 2/1/19  If No, reason     Mirtha Mcmanus [phone] (home)    is requesting refill(s) of medication Bupropion to preferred pharmacy [facility] on [address] [facility] 7/13/18 (pertaining to medication)   Last refill 6/11/18 (per medication requested)  Next office visit scheduled or attempted Yes  Date 2/1/19  If No, reason     Mirtha Mcmanus [phone] (home)    is requesting refill(s) of medication Zetia to preferred pharmacy [facility] on [address] [facility] 7/13/18 (pertaining to medication)   Last refill 2/14/18 (per medication requested)  Next office visit scheduled or attempted Yes  Date 2/1/19  If No, reason       * Patient did not have coverage but now has insurance again and needs these prescriptions filled.

## 2018-09-20 DIAGNOSIS — F90.0 ATTENTION DEFICIT HYPERACTIVITY DISORDER (ADHD), PREDOMINANTLY INATTENTIVE TYPE: ICD-10-CM

## 2018-09-20 RX ORDER — ROSUVASTATIN CALCIUM 40 MG/1
40 TABLET, COATED ORAL DAILY
Qty: 90 TABLET | Refills: 1 | Status: CANCELLED | OUTPATIENT
Start: 2018-09-20

## 2018-09-20 RX ORDER — AMLODIPINE BESYLATE AND BENAZEPRIL HYDROCHLORIDE 5; 10 MG/1; MG/1
1 CAPSULE ORAL DAILY
Qty: 90 CAPSULE | Refills: 3 | Status: CANCELLED | OUTPATIENT
Start: 2018-09-20

## 2018-09-20 RX ORDER — EZETIMIBE 10 MG/1
10 TABLET ORAL DAILY
Qty: 90 TABLET | Refills: 1 | Status: CANCELLED | OUTPATIENT
Start: 2018-09-20

## 2018-09-20 RX ORDER — BUPROPION HYDROCHLORIDE 150 MG/1
450 TABLET ORAL EVERY MORNING
Qty: 270 TABLET | Refills: 1 | Status: CANCELLED | OUTPATIENT
Start: 2018-09-20

## 2018-09-20 RX ORDER — FENOFIBRATE 160 MG/1
160 TABLET ORAL DAILY
Qty: 90 TABLET | Refills: 1 | Status: SHIPPED | OUTPATIENT
Start: 2018-09-20 | End: 2019-04-13 | Stop reason: SDUPTHER

## 2018-09-26 PROBLEM — Z09 POSTOP CHECK: Status: RESOLVED | Noted: 2017-09-05 | Resolved: 2018-09-26

## 2018-10-02 ENCOUNTER — TELEPHONE (OUTPATIENT)
Dept: FAMILY MEDICINE CLINIC | Age: 33
End: 2018-10-02

## 2018-10-02 DIAGNOSIS — F90.0 ATTENTION DEFICIT HYPERACTIVITY DISORDER (ADHD), PREDOMINANTLY INATTENTIVE TYPE: ICD-10-CM

## 2018-10-02 RX ORDER — BUPROPION HYDROCHLORIDE 300 MG/1
300 TABLET ORAL EVERY MORNING
Qty: 30 TABLET | Refills: 3 | Status: SHIPPED | OUTPATIENT
Start: 2018-10-02 | End: 2019-02-10 | Stop reason: SDUPTHER

## 2018-10-02 RX ORDER — BUPROPION HYDROCHLORIDE 150 MG/1
150 TABLET ORAL EVERY MORNING
Qty: 30 TABLET | Refills: 3 | Status: SHIPPED | OUTPATIENT
Start: 2018-10-02 | End: 2019-02-07 | Stop reason: SDUPTHER

## 2018-11-07 DIAGNOSIS — F90.0 ATTENTION DEFICIT HYPERACTIVITY DISORDER (ADHD), PREDOMINANTLY INATTENTIVE TYPE: ICD-10-CM

## 2018-11-07 NOTE — TELEPHONE ENCOUNTER
Andrey Garcia is requesting refill(s) lisdexamfetamine (VYVANSE) 50 MG capsule PRINT  Last OV 7/13/18 (pertaining to medication)  LR 10/2/18 (per medication requested)  Next office visit scheduled or attempted Yes on 2/1/19    Can  Friday Morning ?

## 2018-11-13 ENCOUNTER — PATIENT MESSAGE (OUTPATIENT)
Dept: FAMILY MEDICINE CLINIC | Age: 33
End: 2018-11-13

## 2018-11-21 ENCOUNTER — TELEPHONE (OUTPATIENT)
Dept: FAMILY MEDICINE CLINIC | Age: 33
End: 2018-11-21

## 2018-12-04 ENCOUNTER — TELEPHONE (OUTPATIENT)
Dept: FAMILY MEDICINE CLINIC | Age: 33
End: 2018-12-04

## 2018-12-04 DIAGNOSIS — R74.8 ELEVATED SERUM GGT LEVEL: Primary | ICD-10-CM

## 2018-12-10 DIAGNOSIS — F90.0 ATTENTION DEFICIT HYPERACTIVITY DISORDER (ADHD), PREDOMINANTLY INATTENTIVE TYPE: ICD-10-CM

## 2018-12-13 ENCOUNTER — NURSE ONLY (OUTPATIENT)
Dept: FAMILY MEDICINE CLINIC | Age: 33
End: 2018-12-13
Payer: COMMERCIAL

## 2018-12-13 DIAGNOSIS — R74.8 ELEVATED SERUM GGT LEVEL: ICD-10-CM

## 2018-12-13 LAB — GAMMA GLUTAMYL TRANSFERASE: 85 U/L (ref 8–61)

## 2018-12-13 PROCEDURE — 36415 COLL VENOUS BLD VENIPUNCTURE: CPT | Performed by: INTERNAL MEDICINE

## 2019-01-07 ENCOUNTER — OFFICE VISIT (OUTPATIENT)
Dept: DERMATOLOGY | Age: 34
End: 2019-01-07
Payer: COMMERCIAL

## 2019-01-07 DIAGNOSIS — Z86.018 HISTORY OF DYSPLASTIC NEVUS: ICD-10-CM

## 2019-01-07 DIAGNOSIS — D22.9 MULTIPLE BENIGN MELANOCYTIC NEVI: Primary | ICD-10-CM

## 2019-01-07 DIAGNOSIS — Z72.0 SMOKELESS TOBACCO USE: ICD-10-CM

## 2019-01-07 PROCEDURE — 99213 OFFICE O/P EST LOW 20 MIN: CPT | Performed by: DERMATOLOGY

## 2019-01-09 DIAGNOSIS — F90.0 ATTENTION DEFICIT HYPERACTIVITY DISORDER (ADHD), PREDOMINANTLY INATTENTIVE TYPE: ICD-10-CM

## 2019-02-01 ENCOUNTER — OFFICE VISIT (OUTPATIENT)
Dept: FAMILY MEDICINE CLINIC | Age: 34
End: 2019-02-01
Payer: COMMERCIAL

## 2019-02-01 VITALS
DIASTOLIC BLOOD PRESSURE: 72 MMHG | RESPIRATION RATE: 16 BRPM | WEIGHT: 216 LBS | HEART RATE: 78 BPM | HEIGHT: 73 IN | BODY MASS INDEX: 28.63 KG/M2 | SYSTOLIC BLOOD PRESSURE: 112 MMHG | OXYGEN SATURATION: 98 %

## 2019-02-01 DIAGNOSIS — F90.0 ATTENTION DEFICIT HYPERACTIVITY DISORDER (ADHD), PREDOMINANTLY INATTENTIVE TYPE: ICD-10-CM

## 2019-02-01 DIAGNOSIS — E78.00 PURE HYPERCHOLESTEROLEMIA: ICD-10-CM

## 2019-02-01 DIAGNOSIS — R53.83 FATIGUE, UNSPECIFIED TYPE: ICD-10-CM

## 2019-02-01 DIAGNOSIS — I10 ESSENTIAL HYPERTENSION, BENIGN: ICD-10-CM

## 2019-02-01 DIAGNOSIS — F41.8 DEPRESSION WITH ANXIETY: ICD-10-CM

## 2019-02-01 DIAGNOSIS — M72.2 PLANTAR FASCIITIS OF LEFT FOOT: ICD-10-CM

## 2019-02-01 DIAGNOSIS — Z00.00 ANNUAL PHYSICAL EXAM: Primary | ICD-10-CM

## 2019-02-01 PROCEDURE — 99395 PREV VISIT EST AGE 18-39: CPT | Performed by: INTERNAL MEDICINE

## 2019-02-01 ASSESSMENT — ENCOUNTER SYMPTOMS
EYES NEGATIVE: 1
ALLERGIC/IMMUNOLOGIC NEGATIVE: 1
RESPIRATORY NEGATIVE: 1
GASTROINTESTINAL NEGATIVE: 1

## 2019-02-07 RX ORDER — BUPROPION HYDROCHLORIDE 150 MG/1
TABLET ORAL
Qty: 30 TABLET | Refills: 5 | Status: SHIPPED | OUTPATIENT
Start: 2019-02-07 | End: 2019-08-08 | Stop reason: SDUPTHER

## 2019-02-11 RX ORDER — BUPROPION HYDROCHLORIDE 300 MG/1
TABLET ORAL
Qty: 30 TABLET | Refills: 5 | Status: SHIPPED | OUTPATIENT
Start: 2019-02-11 | End: 2019-07-09 | Stop reason: SDUPTHER

## 2019-03-20 DIAGNOSIS — F90.0 ATTENTION DEFICIT HYPERACTIVITY DISORDER (ADHD), PREDOMINANTLY INATTENTIVE TYPE: ICD-10-CM

## 2019-04-15 ENCOUNTER — PATIENT MESSAGE (OUTPATIENT)
Dept: FAMILY MEDICINE CLINIC | Age: 34
End: 2019-04-15

## 2019-04-15 DIAGNOSIS — F90.0 ATTENTION DEFICIT HYPERACTIVITY DISORDER (ADHD), PREDOMINANTLY INATTENTIVE TYPE: ICD-10-CM

## 2019-04-15 RX ORDER — FENOFIBRATE 160 MG/1
TABLET ORAL
Qty: 90 TABLET | Refills: 1 | Status: SHIPPED | OUTPATIENT
Start: 2019-04-15 | End: 2019-10-05 | Stop reason: SDUPTHER

## 2019-04-15 RX ORDER — AMLODIPINE BESYLATE AND BENAZEPRIL HYDROCHLORIDE 5; 20 MG/1; MG/1
CAPSULE ORAL
Qty: 90 CAPSULE | Refills: 1 | Status: SHIPPED | OUTPATIENT
Start: 2019-04-15 | End: 2019-08-01 | Stop reason: SDUPTHER

## 2019-04-15 RX ORDER — ROSUVASTATIN CALCIUM 40 MG/1
TABLET, COATED ORAL
Qty: 90 TABLET | Refills: 1 | Status: SHIPPED | OUTPATIENT
Start: 2019-04-15 | End: 2019-10-08 | Stop reason: SDUPTHER

## 2019-04-15 RX ORDER — EZETIMIBE 10 MG/1
TABLET ORAL
Qty: 90 TABLET | Refills: 1 | Status: SHIPPED | OUTPATIENT
Start: 2019-04-15 | End: 2019-10-08 | Stop reason: SDUPTHER

## 2019-04-15 NOTE — TELEPHONE ENCOUNTER
From: Dasha Price  To: Kaity Chen MD  Sent: 4/15/2019 5:02 PM EDT  Subject: Prescription Question    I need to submit my vyvance Rx refill to  towards the end of the week. I will be traveling next week so if post date is needed please do so. Please call once complete for me to . Are you open Friday?  Thank you

## 2019-05-13 DIAGNOSIS — F90.0 ATTENTION DEFICIT HYPERACTIVITY DISORDER (ADHD), PREDOMINANTLY INATTENTIVE TYPE: ICD-10-CM

## 2019-06-12 NOTE — PROGRESS NOTES
performance. DSM 5 Diagnostic Interview  ADHD    A. Inattention inclusion: Requires a pattern of behavior, with onset before age 15 years, that is present in multiple settings and gives rise to social, educational, or work performance difficulties. The symptoms must be persistently present for at least 6 months to a degree inconsistent with developmental level. The disorder is manifested by at least six of the following symptoms of inattention. 1. Overlooks details: Over at least the last 6 months, have other people told you that you often overlook or miss details, or that you made careless mistakes in your work? Yes      Examples:  He misses parts in the reports he sends out. He will be forgetful with things he is supposed to follow up on. He will make notes but cannot recall what the notes are pertaining to.   2. Task inattention: Do you often have difficulty staying focused on a task or activity, such as reading a lengthy writing or listening to a lecture or conversation? Yes   Examples:  Lately he has been paying attention to this more. He will start on one area of work, then will go to another activity. By the end of the day will have 10 emails open but none completed. Really struggles with reading comprehension. 3. Appears not to listen: Do other people tell you that when they speak to you, your mind often seems to be elsewhere or that it seems like you are not listening? Yes  Examples:  Segundo tells him often that he is not listening to her. He will have trouble recalling what was plans are for the weekend and she tells him he doesn't listen. 4. Fails to finish tasks: Do you often struggle to finish schoolwork, chores, or work assignments because you lose focus or are easily sidetracked? yes   Examples:  Trouble completing tasks at work. Will get to chores, but tends to procrastinate. 5. Difficulty organizing tasks: Do you find it difficult to organize tasks or activities?  Do you struggle with time management or fail to meet deadlines? Yes   Examples:  He was told on his annual review that he has trouble prioritizing his work. 6. Avoids tasks requiring sustained mental activity: Do you often avoid tasks that require sustained mental effort? Yes  Examples:  When he was studying for [employer] would procrastinate until the very last minute then would use adrenalin to get it done. 7. Often loses things necessary for tasks: Do you often lose things that are essential for tasks or activities, such as school materials, books, tools, wallets, keys, paperwork, eyeglasses, or your phone? Yes   Examples:  Pt loses his keys all the time. Does try to put things in the same place at home so this helps. Will also lose documents and posted notes at work. He had an indcident at work where he emailed out info to a coworker and had absolutely no recollection of this until the email from him was sent back as proof. 8. Easily distracted: Do you find that you are often easily distracted by things or thoughts unrelated to the activity or task your are supposed to be doing? Yes   Examples:  He will read an entire presentation for work and cannot tell you anything that he just read. Especially the following the day if asked to recall info. 9. Often forgetful: Do you find, or do other peopled find, that you are often forgetful in your daily activities? yes   Examples:  Florence Dover, coworkers and supervisors are often telling him he is forgetful. He has been talked to about this by last supervisor, who went to  about this.             O:    MSE:    Appearance    alert, cooperative  Appetite abnormal  Sleep disturbance Yes  Fatigue Yes  Loss of pleasure Yes  Impulsive behavior No  Speech    spontaneous, normal rate and normal volume  Mood    Anxious  Depressed  Affect    depressed affect  Thought Content    cognitive distortions  Thought Process    linear, goal directed and coherent  Associations logical connections  Insight    Good  Judgment    Intact  Orientation    oriented to person, place, time, and general circumstances  Memory    recent and remote memory intact  Attention/Concentration    intact  Morbid ideation No  Suicide Assessment    no suicidal ideation      History:    Medications:   Current Outpatient Prescriptions   Medication Sig Dispense Refill    fenofibrate 160 MG tablet TAKE 1 TABLET BY MOUTH DAILY 30 tablet 5    fenofibrate 160 MG tablet TAKE 1 TABLET BY MOUTH DAILY 30 tablet 5    traZODone (DESYREL) 50 MG tablet Take 1 tablet by mouth nightly as needed for Sleep 30 tablet 5    buPROPion (WELLBUTRIN XL) 150 MG extended release tablet Take 1 tablet by mouth every morning For 2 weeks then increase to 300mg daily 60 tablet 2    DULoxetine (CYMBALTA) 60 MG extended release capsule Take 1 capsule by mouth daily Take in AM with 30 mg cap dose for total of 90 mg a day 30 capsule 5    propranolol (INDERAL) 10 MG tablet Take 1 tablet by mouth daily as needed (anxiety) Take 1-2 tabs (10-20mg) 30min prior to anxiety provoking situation. 60 tablet 3    amLODIPine-benazepril (LOTREL) 5-20 MG per capsule Take 1 capsule by mouth daily 30 capsule 5    rosuvastatin (CRESTOR) 40 MG tablet Take 1 tablet by mouth daily 30 tablet 5    naproxen (NAPROSYN) 500 MG tablet Take 1 tablet by mouth 2 times daily (with meals). 60 tablet 3     No current facility-administered medications for this visit. Social History:   Social History     Social History    Marital status:      Spouse name: N/A    Number of children: N/A    Years of education: N/A     Occupational History    Not on file.      Social History Main Topics    Smoking status: Former Smoker     Packs/day: 0.50     Years: 9.00     Types: Cigarettes     Quit date: 8/28/2011    Smokeless tobacco: Current User     Types: Chew    Alcohol use 3.6 oz/week     2 Glasses of wine, 2 Cans of beer, 2 Shots of liquor per week      Comment: daily    Drug use: No    Sexual activity: Not on file     Other Topics Concern    Not on file     Social History Narrative    No narrative on file       TOBACCO:   reports that he quit smoking about 6 years ago. His smoking use included Cigarettes. He has a 4.50 pack-year smoking history. His smokeless tobacco use includes Chew. ETOH:   reports that he drinks about 3.6 oz of alcohol per week . Family History:   Family History   Problem Relation Age of Onset    High Blood Pressure Father     High Cholesterol Father     Asthma Brother     Cancer Paternal Grandfather     High Blood Pressure Paternal Grandfather     High Cholesterol Paternal Grandfather     Diabetes Neg Hx     Emphysema Neg Hx     Heart Failure Neg Hx     Hypertension Neg Hx      PHQ Scores 11/22/2017 9/28/2017 9/12/2017 8/24/2017 8/9/2017 7/26/2017 7/13/2017   PHQ2 Score 3 3 5 3 2 2 2   PHQ9 Score 14 11 22 10 13 5 10     Interpretation of Total Score Depression Severity: 1-4 = Minimal depression, 5-9 = Mild depression, 10-14 = Moderate depression, 15-19 = Moderately severe depression, 20-27 = Severe depression      A:  Pt meets the DSM V criteria for ADHD. Dx at the age of 22 by psychologist in [place], two hour testing performed. Drastic improvement in grades with adding stimulant. Has not been on medication since graduate school. Poor work performance may have significant correlation to untreated ADHD. Depression and anxiety is likely exacerbated by untreated ADHD and stress regarding work performance. Depression and anxiety more stable than in the past, therefore concentration issues do not seem directly correlated to this. Self esteem issues stemmed from being placed in special education classes in . Still believes he is not intelligent and fears other will uncover this about him as well. Denied SI/HI.       Diagnosis:  ADHD, inattentive type, moderate  Problems with primary support group, Problems related to the social Surgeon: Dr. Ly

## 2019-06-17 ENCOUNTER — PATIENT MESSAGE (OUTPATIENT)
Dept: FAMILY MEDICINE CLINIC | Age: 34
End: 2019-06-17

## 2019-06-17 DIAGNOSIS — F90.0 ATTENTION DEFICIT HYPERACTIVITY DISORDER (ADHD), PREDOMINANTLY INATTENTIVE TYPE: ICD-10-CM

## 2019-07-09 RX ORDER — BUPROPION HYDROCHLORIDE 300 MG/1
TABLET ORAL
Qty: 90 TABLET | Refills: 1 | Status: SHIPPED | OUTPATIENT
Start: 2019-07-09 | End: 2019-11-04 | Stop reason: SDUPTHER

## 2019-07-22 ENCOUNTER — PATIENT MESSAGE (OUTPATIENT)
Dept: FAMILY MEDICINE CLINIC | Age: 34
End: 2019-07-22

## 2019-07-22 DIAGNOSIS — F90.0 ATTENTION DEFICIT HYPERACTIVITY DISORDER (ADHD), PREDOMINANTLY INATTENTIVE TYPE: ICD-10-CM

## 2019-08-01 ENCOUNTER — OFFICE VISIT (OUTPATIENT)
Dept: FAMILY MEDICINE CLINIC | Age: 34
End: 2019-08-01
Payer: COMMERCIAL

## 2019-08-01 VITALS
WEIGHT: 216.8 LBS | HEART RATE: 78 BPM | BODY MASS INDEX: 28.73 KG/M2 | SYSTOLIC BLOOD PRESSURE: 102 MMHG | HEIGHT: 73 IN | RESPIRATION RATE: 16 BRPM | OXYGEN SATURATION: 99 % | DIASTOLIC BLOOD PRESSURE: 60 MMHG

## 2019-08-01 DIAGNOSIS — E78.00 PURE HYPERCHOLESTEROLEMIA: ICD-10-CM

## 2019-08-01 DIAGNOSIS — I10 ESSENTIAL HYPERTENSION, BENIGN: ICD-10-CM

## 2019-08-01 DIAGNOSIS — F90.0 ATTENTION DEFICIT HYPERACTIVITY DISORDER (ADHD), PREDOMINANTLY INATTENTIVE TYPE: ICD-10-CM

## 2019-08-01 DIAGNOSIS — F41.8 DEPRESSION WITH ANXIETY: ICD-10-CM

## 2019-08-01 DIAGNOSIS — M72.2 PLANTAR FASCIITIS OF LEFT FOOT: ICD-10-CM

## 2019-08-01 PROCEDURE — 99214 OFFICE O/P EST MOD 30 MIN: CPT | Performed by: INTERNAL MEDICINE

## 2019-08-01 ASSESSMENT — PATIENT HEALTH QUESTIONNAIRE - PHQ9
2. FEELING DOWN, DEPRESSED OR HOPELESS: 0
SUM OF ALL RESPONSES TO PHQ QUESTIONS 1-9: 0
SUM OF ALL RESPONSES TO PHQ9 QUESTIONS 1 & 2: 0
SUM OF ALL RESPONSES TO PHQ QUESTIONS 1-9: 0
1. LITTLE INTEREST OR PLEASURE IN DOING THINGS: 0

## 2019-08-01 ASSESSMENT — ENCOUNTER SYMPTOMS
GASTROINTESTINAL NEGATIVE: 1
ALLERGIC/IMMUNOLOGIC NEGATIVE: 1
RESPIRATORY NEGATIVE: 1

## 2019-08-01 NOTE — PATIENT INSTRUCTIONS
All above problems reviewed and the found to be unchanged except for the following :     Attention Deficit Hyperactivity Disorder (Adhd), Predominantly Inattentive Type. Continue meds. Call if new c/o. Essential Hypertension, Benign. Continue present meds. Home BP checks. Call if>140/90. Improve diet. Avoid caffeine and salt. Call if new c/o w/ meds. Pure Hypercholesterolemia. Continue to improve diet and exercise. Continue meds. Call if new c/o. Depression With Anxiety. Continue meds. Call if new c/o. Plantar Fasciitis of Left Foot. Continue stretch and ice. To call if new c/o.

## 2019-08-01 NOTE — ASSESSMENT & PLAN NOTE
Wt up some. Diet fair. No c/o w/ meds. Labs show elevated Cholesterol and TGs in past. Last TG was some better.

## 2019-08-01 NOTE — PROGRESS NOTES
Subjective:      Patient ID: Dorothy Monterroso is a 27 y.o. male. HPI  Essential hypertension, benign  No change in meds, no c/o with meds, no chest pain, SOB, palpatations, or syncope. Home bp was 120s/80s. Pure hypercholesterolemia  Wt up some. Diet fair. No c/o w/ meds. Labs show elevated Cholesterol and TGs in past. Last TG was some better. Depression with anxiety  Has improved w/ med. No recent change in meds or c/o. Sleeping better. Attention deficit hyperactivity disorder (ADHD), predominantly inattentive type  Dx by Psych and started on Vyvance and doing better. Plantar fasciitis of left foot  Started 12 mo ago w/ pain first in am worse after walking distance. Some better w/ stretches and ice. Has improved w/ increased exercise. Past Medical History:   Diagnosis Date    Chronic back pain     Hyperlipidemia     Hypertension     Prolonged emergence from general anesthesia     Umbilical hernia      Current Outpatient Medications   Medication Sig Dispense Refill    lisdexamfetamine (VYVANSE) 50 MG capsule Take 1 capsule by mouth every morning for 30 days. 30 capsule 0    buPROPion (WELLBUTRIN XL) 300 MG extended release tablet TAKE 1 TABLET BY MOUTH EVERY MORNING. TAKE WITH 150MG TAB FOR TOTAL OF 450MGDAILY 90 tablet 1    rosuvastatin (CRESTOR) 40 MG tablet TAKE 1 TABLET BY MOUTH EVERY DAY 90 tablet 1    ezetimibe (ZETIA) 10 MG tablet TAKE 1 TABLET BY MOUTH EVERY DAY 90 tablet 1    fenofibrate 160 MG tablet TAKE 1 TABLET BY MOUTH EVERY DAY 90 tablet 1    buPROPion (WELLBUTRIN XL) 150 MG extended release tablet TAKE 1 TABLET BY MOUTH EVERY DAY IN THE MORNING.  TAKE WITH 300 MG FOR TOTAL 450 MG DOSE 30 tablet 5    amLODIPine-benazepril (LOTREL) 5-10 MG per capsule Take 1 capsule by mouth daily 90 capsule 3    traZODone (DESYREL) 50 MG tablet Take 1 tablet by mouth nightly as needed for Sleep 30 tablet 5    propranolol (INDERAL) 10 MG tablet Take 1 tablet by mouth daily as needed

## 2019-08-07 RX ORDER — PROPRANOLOL HYDROCHLORIDE 10 MG/1
10 TABLET ORAL DAILY PRN
Qty: 60 TABLET | Refills: 3 | Status: CANCELLED | OUTPATIENT
Start: 2019-08-07

## 2019-08-07 RX ORDER — PROPRANOLOL HYDROCHLORIDE 10 MG/1
10 TABLET ORAL DAILY PRN
Qty: 60 TABLET | Refills: 5 | Status: SHIPPED | OUTPATIENT
Start: 2019-08-07 | End: 2020-02-04

## 2019-08-07 RX ORDER — TRAZODONE HYDROCHLORIDE 50 MG/1
50 TABLET ORAL NIGHTLY PRN
Qty: 30 TABLET | Refills: 5 | Status: SHIPPED | OUTPATIENT
Start: 2019-08-07 | End: 2019-11-05 | Stop reason: SDUPTHER

## 2019-08-07 RX ORDER — TRAZODONE HYDROCHLORIDE 50 MG/1
50 TABLET ORAL NIGHTLY PRN
Qty: 30 TABLET | Refills: 5 | Status: CANCELLED | OUTPATIENT
Start: 2019-08-07

## 2019-08-08 RX ORDER — BUPROPION HYDROCHLORIDE 150 MG/1
TABLET ORAL
Qty: 90 TABLET | Refills: 1 | Status: SHIPPED | OUTPATIENT
Start: 2019-08-08 | End: 2019-11-04 | Stop reason: SDUPTHER

## 2019-08-13 ENCOUNTER — PATIENT MESSAGE (OUTPATIENT)
Dept: FAMILY MEDICINE CLINIC | Age: 34
End: 2019-08-13

## 2019-09-06 ENCOUNTER — NURSE ONLY (OUTPATIENT)
Dept: FAMILY MEDICINE CLINIC | Age: 34
End: 2019-09-06
Payer: COMMERCIAL

## 2019-09-06 DIAGNOSIS — E78.00 PURE HYPERCHOLESTEROLEMIA: Primary | ICD-10-CM

## 2019-09-06 DIAGNOSIS — F90.0 ATTENTION DEFICIT HYPERACTIVITY DISORDER (ADHD), PREDOMINANTLY INATTENTIVE TYPE: ICD-10-CM

## 2019-09-06 DIAGNOSIS — I10 ESSENTIAL HYPERTENSION, BENIGN: ICD-10-CM

## 2019-09-06 LAB
ALBUMIN SERPL-MCNC: 4.7 G/DL (ref 3.4–5)
ALP BLD-CCNC: 46 U/L (ref 40–129)
ALT SERPL-CCNC: 26 U/L (ref 10–40)
ANION GAP SERPL CALCULATED.3IONS-SCNC: 17 MMOL/L (ref 3–16)
AST SERPL-CCNC: 33 U/L (ref 15–37)
BILIRUB SERPL-MCNC: 0.4 MG/DL (ref 0–1)
BILIRUBIN DIRECT: <0.2 MG/DL (ref 0–0.3)
BILIRUBIN, INDIRECT: NORMAL MG/DL (ref 0–1)
BUN BLDV-MCNC: 14 MG/DL (ref 7–20)
CALCIUM SERPL-MCNC: 10 MG/DL (ref 8.3–10.6)
CHLORIDE BLD-SCNC: 100 MMOL/L (ref 99–110)
CHOLESTEROL, TOTAL: 157 MG/DL (ref 0–199)
CO2: 24 MMOL/L (ref 21–32)
CREAT SERPL-MCNC: 1.1 MG/DL (ref 0.9–1.3)
GFR AFRICAN AMERICAN: >60
GFR NON-AFRICAN AMERICAN: >60
GLUCOSE BLD-MCNC: 78 MG/DL (ref 70–99)
HDLC SERPL-MCNC: 52 MG/DL (ref 40–60)
LDL CHOLESTEROL CALCULATED: 65 MG/DL
POTASSIUM SERPL-SCNC: 4.3 MMOL/L (ref 3.5–5.1)
SODIUM BLD-SCNC: 141 MMOL/L (ref 136–145)
TOTAL PROTEIN: 7.3 G/DL (ref 6.4–8.2)
TRIGL SERPL-MCNC: 200 MG/DL (ref 0–150)
VLDLC SERPL CALC-MCNC: 40 MG/DL

## 2019-09-06 PROCEDURE — 36415 COLL VENOUS BLD VENIPUNCTURE: CPT | Performed by: INTERNAL MEDICINE

## 2019-09-06 NOTE — TELEPHONE ENCOUNTER
Iván Birdavan [phone] (home)    is requesting refill(s) of medication lisdexamfetamine (VYVANSE) 50 MG capsule     to preferred pharmacy [address] 8/1/19 (pertaining to medication)   Last refill 7/22/19 (per medication requested)  Next office visit scheduled or attempted Yes  Date 2/10/20  If No, reason     Please advise pt when script is ready for  [phone]

## 2019-10-07 RX ORDER — AMLODIPINE BESYLATE AND BENAZEPRIL HYDROCHLORIDE 5; 20 MG/1; MG/1
CAPSULE ORAL
Qty: 90 CAPSULE | Refills: 1 | Status: SHIPPED | OUTPATIENT
Start: 2019-10-07 | End: 2020-02-10 | Stop reason: SDUPTHER

## 2019-10-07 RX ORDER — FENOFIBRATE 160 MG/1
TABLET ORAL
Qty: 90 TABLET | Refills: 1 | Status: SHIPPED | OUTPATIENT
Start: 2019-10-07 | End: 2020-05-22

## 2019-10-08 RX ORDER — ROSUVASTATIN CALCIUM 40 MG/1
TABLET, COATED ORAL
Qty: 90 TABLET | Refills: 1 | Status: SHIPPED | OUTPATIENT
Start: 2019-10-08 | End: 2020-05-22

## 2019-10-08 RX ORDER — EZETIMIBE 10 MG/1
TABLET ORAL
Qty: 90 TABLET | Refills: 1 | Status: SHIPPED | OUTPATIENT
Start: 2019-10-08 | End: 2020-05-22

## 2019-10-14 DIAGNOSIS — F90.0 ATTENTION DEFICIT HYPERACTIVITY DISORDER (ADHD), PREDOMINANTLY INATTENTIVE TYPE: ICD-10-CM

## 2019-11-04 ENCOUNTER — PATIENT MESSAGE (OUTPATIENT)
Dept: FAMILY MEDICINE CLINIC | Age: 34
End: 2019-11-04

## 2019-11-04 RX ORDER — BUPROPION HYDROCHLORIDE 300 MG/1
TABLET ORAL
Qty: 90 TABLET | Refills: 1 | Status: SHIPPED | OUTPATIENT
Start: 2019-11-04 | End: 2020-08-17

## 2019-11-04 RX ORDER — BUPROPION HYDROCHLORIDE 150 MG/1
TABLET ORAL
Qty: 90 TABLET | Refills: 1 | Status: SHIPPED | OUTPATIENT
Start: 2019-11-04 | End: 2020-08-17

## 2019-11-05 RX ORDER — TRAZODONE HYDROCHLORIDE 50 MG/1
50 TABLET ORAL NIGHTLY PRN
Qty: 90 TABLET | Refills: 1 | Status: SHIPPED | OUTPATIENT
Start: 2019-11-05 | End: 2020-07-15

## 2019-11-18 ENCOUNTER — PATIENT MESSAGE (OUTPATIENT)
Dept: FAMILY MEDICINE CLINIC | Age: 34
End: 2019-11-18

## 2019-11-18 DIAGNOSIS — F90.0 ATTENTION DEFICIT HYPERACTIVITY DISORDER (ADHD), PREDOMINANTLY INATTENTIVE TYPE: ICD-10-CM

## 2020-01-07 ENCOUNTER — OFFICE VISIT (OUTPATIENT)
Dept: DERMATOLOGY | Age: 35
End: 2020-01-07
Payer: COMMERCIAL

## 2020-01-07 PROCEDURE — 99213 OFFICE O/P EST LOW 20 MIN: CPT | Performed by: DERMATOLOGY

## 2020-01-07 NOTE — PROGRESS NOTES
[address] [place], [place], [address]       Tiarra Abernathy  1985    27 y.o. male     Date of Visit: 2020    Chief Complaint:   Chief Complaint   Patient presents with    Skin Exam     moles, [place]        I was asked to see this patient by Dr. Castillo ref. provider found. History of Present Illness:  Tiarra Abernathy is a 27 y.o. male who presents with the chief complaint of the followin. Waist up skin cancer screening exam requested by pt. History of moderately dysplastic nevus, denies recurrence. 2.Many year history of multiple nevi on the head/neck, trunk and extremities, all present for many years. Denies new moles. Denies moles changing in size, shape, color. None associated w/ pain, bleeding, pruritus. 3.  Has noticed a small bump appear to his vertex scalp over the last 1 to 2 years. Denies any swelling, pain, drainage, bleeding. Review of Systems:  Constitutional: Reports general sense of well-being   Skin: No new or changing moles, no tendency to develop thick scars, no interval of severe sunburns  Heme: No abnormal bruising or bleeding. Past Medical History, Family History, Surgical History, Medications and Allergies reviewed.     Past Skin Hx:  -17-right paraspinal midback- hx of moderately dysplastic nevus surgically excised   Patient denies past history of melanoma, NMSC, dysplastic nevi, or chronic skin rashes.     PFHx: Denies hx of MM or NMSC    Family History   Problem Relation Age of Onset    High Blood Pressure Father     High Cholesterol Father     Asthma Brother     Cancer Paternal Grandfather     High Blood Pressure Paternal Grandfather     High Cholesterol Paternal Grandfather     Diabetes Neg Hx     Emphysema Neg Hx     Heart Failure Neg Hx     Hypertension Neg Hx      Past Medical History:   Diagnosis Date    Chronic back pain     Hyperlipidemia     Hypertension     Prolonged emergence from general anesthesia     Umbilical hernia      Past Surgical History:   Procedure Laterality Date    EXTERNAL EAR SURGERY Right 2262    UMBILICAL HERNIA REPAIR  08/17/2017       No Known Allergies  Outpatient Medications Marked as Taking for the 1/7/20 encounter (Office Visit) with Keagan Chandler DO   Medication Sig Dispense Refill    lisdexamfetamine (VYVANSE) 50 MG capsule Take 1 capsule by mouth every morning for 30 days. 30 capsule 0    traZODone (DESYREL) 50 MG tablet Take 1 tablet by mouth nightly as needed for Sleep 90 tablet 1    buPROPion (WELLBUTRIN XL) 150 MG extended release tablet TAKE 1 TABLET BY MOUTH EVERY DAY IN THE MORNING. TAKE WITH 300 MG FOR TOTAL 450 MG DOSE 90 tablet 1    buPROPion (WELLBUTRIN XL) 300 MG extended release tablet TAKE 1 TABLET BY MOUTH EVERY MORNING. TAKE WITH 150MG TAB FOR TOTAL OF 450MGDAILY 90 tablet 1    ezetimibe (ZETIA) 10 MG tablet TAKE 1 TABLET BY MOUTH EVERY DAY 90 tablet 1    rosuvastatin (CRESTOR) 40 MG tablet TAKE 1 TABLET BY MOUTH EVERY DAY 90 tablet 1    amLODIPine-benazepril (LOTREL) 5-20 MG per capsule TAKE 1 CAPSULE BY MOUTH EVERY DAY 90 capsule 1    fenofibrate 160 MG tablet TAKE 1 TABLET BY MOUTH EVERY DAY 90 tablet 1    propranolol (INDERAL) 10 MG tablet Take 1 tablet by mouth daily as needed (anxiety) Take 1-2 tabs (10-20mg) 30min prior to anxiety provoking situation.  60 tablet 5    amLODIPine-benazepril (LOTREL) 5-10 MG per capsule Take 1 capsule by mouth daily 90 capsule 3       Social History:   Social History     Socioeconomic History    Marital status:      Spouse name: Not on file    Number of children: Not on file    Years of education: Not on file    Highest education level: Not on file   Occupational History    Not on file   Social Needs    Financial resource strain: Not on file    Food insecurity:     Worry: Not on file     Inability: Not on file    Transportation needs:     Medical: Not on file     Non-medical: Not on file   Tobacco Use    Smoking status: Former Smoker     Packs/day: 0.50     Years: 9.00     Pack years: 4.50     Types: Cigarettes     Last attempt to quit: 2011     Years since quittin.3    Smokeless tobacco: Current User     Types: Chew   Substance and Sexual Activity    Alcohol use: Yes     Alcohol/week: 6.0 standard drinks     Types: 2 Glasses of wine, 2 Cans of beer, 2 Shots of liquor per week     Comment: daily    Drug use: No    Sexual activity: Not on file   Lifestyle    Physical activity:     Days per week: Not on file     Minutes per session: Not on file    Stress: Not on file   Relationships    Social connections:     Talks on phone: Not on file     Gets together: Not on file     Attends Yazdanism service: Not on file     Active member of club or organization: Not on file     Attends meetings of clubs or organizations: Not on file     Relationship status: Not on file    Intimate partner violence:     Fear of current or ex partner: Not on file     Emotionally abused: Not on file     Physically abused: Not on file     Forced sexual activity: Not on file   Other Topics Concern    Not on file   Social History Narrative    Not on file       Physical Examination     The following were examined and determined to be normal: Psych/Neuro, Scalp/hair, Head/face, Conjunctivae/eyelids, Gums/teeth/lips, Neck, Breast/axilla/chest, Abdomen, Back, RUE and LUE. The following were examined and determined to be abnormal: none. Richey phototype: 2    -General: A+Ox3, NAD, well-nourished, well-developed. Areas of skin examined as listed above:   1. Scattered on the head,neck, trunk and extremities are multiple well-defined round and oval symmetric smoothly-bordered uniformly brown macules and papules. no change in size/shape/color of any lesions; no bleeding lesions.   2.  Vertex scalp-1.0 cm smooth subcutaneous mobile nodule, nontender to palpation  3. right paraspinal midback-well-healed linear scar, clear  Assessment and Plan     1. Multiple benign melanocytic nevi    2. Pilar cyst    3. History of dysplastic nevus        1. Multiple benign melanocytic nevi  Benign acquired melanocytic nevi  -Recommend monthly self skin exams   -Educated regarding the ABCDEs of melanoma detection   -Call for any new/changing moles or concerning lesions  -Reviewed sun protective behavior -- sun avoidance during the peak hours of the day, sun-protective clothing (including hat and sunglasses), sunscreen use (water resistant, broad spectrum, SPF at least 30, need for reapplication every 1.5 to 2 hours), avoidance of tanning beds   -Plan: Observation with annual skin checks (earlier if indicated) performed in office to monitor current nevi and to assess for new lesions. 2. Pilar cyst  -I counseled the patient regarding the following:  Pilar cysts require no specific skin care. Pilar cysts are benign sacs within the skin of the scalp that contain keratin. There can be multiple pilar cysts at one time and there also can be a genetic pre-disposition to developing these cysts. -Patient is to contact the office if the pilar cyst ruptures, becomes inflamed/red, or tender. pt. Informed definitive treatment is surgical excision  -Reassurance and observation    3. History of dysplastic nevus  No evidence of recurrence  Recommend at least annual skin exams, earlier if notices suspicious new growths or changes. Return to Clinic: 1 year skin exam/PRN 30 min pilar cyst excision  Discussed plan with patient and/or primary caretaker. Patient to call clinic with any questions / concerns. Reviewed proper use and side effects of treatment(s) and/or medication(s) with patient and/or primary caretaker. AVS provided or is available on [employer]     Note is transcribed using voice recognition software. Inadvertent computerized transcription errors may be present.

## 2020-02-04 RX ORDER — PROPRANOLOL HYDROCHLORIDE 10 MG/1
TABLET ORAL
Qty: 180 TABLET | Refills: 1 | Status: SHIPPED | OUTPATIENT
Start: 2020-02-04

## 2020-02-10 ENCOUNTER — OFFICE VISIT (OUTPATIENT)
Dept: FAMILY MEDICINE CLINIC | Age: 35
End: 2020-02-10
Payer: COMMERCIAL

## 2020-02-10 VITALS
DIASTOLIC BLOOD PRESSURE: 74 MMHG | HEART RATE: 62 BPM | RESPIRATION RATE: 18 BRPM | WEIGHT: 219 LBS | OXYGEN SATURATION: 99 % | HEIGHT: 73 IN | SYSTOLIC BLOOD PRESSURE: 112 MMHG | BODY MASS INDEX: 29.03 KG/M2

## 2020-02-10 LAB
ALBUMIN SERPL-MCNC: 4.8 G/DL (ref 3.4–5)
ALP BLD-CCNC: 55 U/L (ref 40–129)
ALT SERPL-CCNC: 23 U/L (ref 10–40)
ANION GAP SERPL CALCULATED.3IONS-SCNC: 16 MMOL/L (ref 3–16)
AST SERPL-CCNC: 28 U/L (ref 15–37)
BILIRUB SERPL-MCNC: 0.6 MG/DL (ref 0–1)
BILIRUBIN DIRECT: <0.2 MG/DL (ref 0–0.3)
BILIRUBIN, INDIRECT: NORMAL MG/DL (ref 0–1)
BUN BLDV-MCNC: 12 MG/DL (ref 7–20)
CALCIUM SERPL-MCNC: 10.1 MG/DL (ref 8.3–10.6)
CHLORIDE BLD-SCNC: 99 MMOL/L (ref 99–110)
CHOLESTEROL, TOTAL: 185 MG/DL (ref 0–199)
CO2: 25 MMOL/L (ref 21–32)
CREAT SERPL-MCNC: 1 MG/DL (ref 0.9–1.3)
GFR AFRICAN AMERICAN: >60
GFR NON-AFRICAN AMERICAN: >60
GLUCOSE BLD-MCNC: 95 MG/DL (ref 70–99)
HCT VFR BLD CALC: 44.2 % (ref 40.5–52.5)
HDLC SERPL-MCNC: 52 MG/DL (ref 40–60)
HEMOGLOBIN: 15 G/DL (ref 13.5–17.5)
LDL CHOLESTEROL CALCULATED: 87 MG/DL
MCH RBC QN AUTO: 30.9 PG (ref 26–34)
MCHC RBC AUTO-ENTMCNC: 33.9 G/DL (ref 31–36)
MCV RBC AUTO: 91.2 FL (ref 80–100)
PDW BLD-RTO: 14 % (ref 12.4–15.4)
PHOSPHORUS: 3.6 MG/DL (ref 2.5–4.9)
PLATELET # BLD: 313 K/UL (ref 135–450)
PMV BLD AUTO: 8.7 FL (ref 5–10.5)
POTASSIUM SERPL-SCNC: 4.3 MMOL/L (ref 3.5–5.1)
RBC # BLD: 4.85 M/UL (ref 4.2–5.9)
SODIUM BLD-SCNC: 140 MMOL/L (ref 136–145)
TOTAL PROTEIN: 7.4 G/DL (ref 6.4–8.2)
TRIGL SERPL-MCNC: 232 MG/DL (ref 0–150)
TSH SERPL DL<=0.05 MIU/L-ACNC: 3.37 UIU/ML (ref 0.27–4.2)
VLDLC SERPL CALC-MCNC: 46 MG/DL
WBC # BLD: 9.2 K/UL (ref 4–11)

## 2020-02-10 PROCEDURE — 99395 PREV VISIT EST AGE 18-39: CPT | Performed by: INTERNAL MEDICINE

## 2020-02-10 PROCEDURE — 36415 COLL VENOUS BLD VENIPUNCTURE: CPT | Performed by: INTERNAL MEDICINE

## 2020-02-10 ASSESSMENT — ENCOUNTER SYMPTOMS
EYES NEGATIVE: 1
ALLERGIC/IMMUNOLOGIC NEGATIVE: 1
GASTROINTESTINAL NEGATIVE: 1
RESPIRATORY NEGATIVE: 1

## 2020-02-10 NOTE — PATIENT INSTRUCTIONS
All above problems reviewed and the found to be unchanged except for the following :     Annual Physical Exam. Maintain of decrease alcohol use. Call if new c/o. Attention Deficit Hyperactivity Disorder (Adhd), Predominantly Inattentive Type. Continue meds. Call if new c/o. Essential Hypertension, Benign. Continue present meds. Home BP checks. Call if>140/90. Improve diet. Avoid caffeine and salt. Call if new c/o w/ meds. Pure Hypercholesterolemia. Will do labs and adjust med if needed. Depression With Anxiety. Continue meds and call if new c/o. Plantar Fasciitis of Left Foot. Continue stretching exercise prior to exercise. Umbilical Hernia Without Obstruction and Without Gangrene. Resolved. Prostate: na  Colonoscopy: na  DEXA: na  Eye: 2019  Hearing: passed  Immunization: up to date.   MMSE: na  Get Up and Go: na  Tob: nonsmoker  ETOH: 10-12 beers/week  Caffeine: moderate  Cardiac Risk Assessment: pending  Living will: no  Medical power of : no

## 2020-02-10 NOTE — ASSESSMENT & PLAN NOTE
Has improved w/ med. No recent change in meds or c/o. Sleeping better. Not seeing Counselor over last yr. Public Transport

## 2020-02-10 NOTE — PROGRESS NOTES
Subjective:      Patient ID: Kyle Isabel is a 27 y.o. male. HPI  Annual physical exam  Prostate: na  Colonoscopy: na  DEXA: na  Eye: 2019  Hearing: passed  Immunization: up to date. MMSE: na  Get Up and Go: na  Tob: nonsmoker  ETOH: 10-12 beers/week  Caffeine: moderate  Cardiac Risk Assessment: pending  Living will: no  Medical power of : no    Essential hypertension, benign  No change in meds, no c/o with meds, no chest pain, SOB, palpatations, or syncope. Home bp was 110-130s/70-80s. Pure hypercholesterolemia  Wt up some. Diet fair. No c/o w/ meds. Labs show elevated Cholesterol and TGs in past. Last TG was some better. Umbilical hernia without obstruction and without gangrene  Doing well s/p surgical repair. Plantar fasciitis of left foot  Started 18 mo ago w/ pain first in am worse after walking distance. Some better w/ stretches and ice. Has improved w/ increased exercise. Now not noticeable. Depression with anxiety  Has improved w/ med. No recent change in meds or c/o. Sleeping better. Not seeing Counselor over last yr. Attention deficit hyperactivity disorder (ADHD), predominantly inattentive type  Dx by Psych and started on Vyvance and doing better. Past Medical History:   Diagnosis Date    Chronic back pain     Hyperlipidemia     Hypertension     Prolonged emergence from general anesthesia     Umbilical hernia      Current Outpatient Medications   Medication Sig Dispense Refill    propranolol (INDERAL) 10 MG tablet TAKE 1 TAB DAILY AS NEEDED. TAKE 1-2 TABS (10-20MG) 30MIN PRIOR TO ANXIETY PROVOKING SITUATION. 180 tablet 1    lisdexamfetamine (VYVANSE) 50 MG capsule Take 1 capsule by mouth every morning for 30 days. 30 capsule 0    traZODone (DESYREL) 50 MG tablet Take 1 tablet by mouth nightly as needed for Sleep 90 tablet 1    buPROPion (WELLBUTRIN XL) 150 MG extended release tablet TAKE 1 TABLET BY MOUTH EVERY DAY IN THE MORNING.  TAKE WITH 300 MG FOR TOTAL 450 MG DOSE 90 tablet 1    buPROPion (WELLBUTRIN XL) 300 MG extended release tablet TAKE 1 TABLET BY MOUTH EVERY MORNING. TAKE WITH 150MG TAB FOR TOTAL OF 450MGDAILY 90 tablet 1    ezetimibe (ZETIA) 10 MG tablet TAKE 1 TABLET BY MOUTH EVERY DAY 90 tablet 1    rosuvastatin (CRESTOR) 40 MG tablet TAKE 1 TABLET BY MOUTH EVERY DAY 90 tablet 1    fenofibrate 160 MG tablet TAKE 1 TABLET BY MOUTH EVERY DAY 90 tablet 1    amLODIPine-benazepril (LOTREL) 5-10 MG per capsule Take 1 capsule by mouth daily 90 capsule 3     No current facility-administered medications for this visit. No Known Allergies  Social History     Tobacco Use    Smoking status: Former Smoker     Packs/day: 0.50     Years: 9.00     Pack years: 4.50     Types: Cigarettes     Last attempt to quit: 2011     Years since quittin.4    Smokeless tobacco: Current User     Types: Chew   Substance Use Topics    Alcohol use: Yes     Alcohol/week: 6.0 standard drinks     Types: 2 Glasses of wine, 2 Cans of beer, 2 Shots of liquor per week     Comment: daily     Family History   Problem Relation Age of Onset    High Blood Pressure Father     High Cholesterol Father     Asthma Brother     Cancer Paternal Grandfather     High Blood Pressure Paternal Grandfather     High Cholesterol Paternal Grandfather     Diabetes Neg Hx     Emphysema Neg Hx     Heart Failure Neg Hx     Hypertension Neg Hx        Review of Systems   Constitutional: Positive for fatigue. HENT: Negative. Eyes: Negative. Respiratory: Negative. Cardiovascular: Negative. Gastrointestinal: Negative. Endocrine: Negative. Genitourinary: Negative. Musculoskeletal: Negative. Skin: Negative. Allergic/Immunologic: Negative. Neurological: Negative. Hematological: Negative. Psychiatric/Behavioral: Positive for decreased concentration and dysphoric mood. The patient is nervous/anxious.       Vitals:    02/10/20 0802 02/10/20 0827   BP: 106/72 112/74 side and 2+ on the left side. Radial pulses are 2+ on the right side and 2+ on the left side. Femoral pulses are 2+ on the right side and 2+ on the left side. Popliteal pulses are 2+ on the right side and 2+ on the left side. Dorsalis pedis pulses are 2+ on the right side and 2+ on the left side. Posterior tibial pulses are 2+ on the right side and 2+ on the left side. Heart sounds: Normal heart sounds. No murmur. No friction rub. No gallop. Pulmonary:      Effort: Pulmonary effort is normal. No tachypnea, bradypnea, accessory muscle usage or respiratory distress. Breath sounds: Normal breath sounds. No stridor. No decreased breath sounds, wheezing, rhonchi or rales. Chest:      Chest wall: No tenderness. Abdominal:      General: Abdomen is flat. Bowel sounds are normal. There is no distension or abdominal bruit. Palpations: Abdomen is soft. There is no shifting dullness, fluid wave, mass or pulsatile mass. Tenderness: There is no abdominal tenderness. There is no right CVA tenderness, left CVA tenderness, guarding or rebound. Hernia: No hernia is present. There is no hernia in the ventral area, right inguinal area or left inguinal area. Genitourinary:     Penis: Normal. No tenderness. Scrotum/Testes: Normal. Cremasteric reflex is present. Prostate: Normal.      Rectum: Normal. Guaiac result negative. Musculoskeletal: Normal range of motion. General: No swelling, tenderness, deformity or signs of injury. Right lower leg: No edema. Left lower leg: No edema. Lymphadenopathy:      Head:      Right side of head: No submental, submandibular, tonsillar, preauricular, posterior auricular or occipital adenopathy. Left side of head: No submental, submandibular, tonsillar, preauricular, posterior auricular or occipital adenopathy. Cervical: No cervical adenopathy.       Upper Body:      Right upper body: No supraclavicular or epitrochlear adenopathy. Left upper body: No supraclavicular or epitrochlear adenopathy. Skin:     General: Skin is warm and dry. Capillary Refill: Capillary refill takes less than 2 seconds. Coloration: Skin is not jaundiced or pale. Findings: No abrasion, bruising, erythema, lesion or rash. Nails: There is no clubbing. Neurological:      General: No focal deficit present. Mental Status: He is alert and oriented to person, place, and time. Mental status is at baseline. He is not disoriented. Cranial Nerves: No cranial nerve deficit. Sensory: No sensory deficit. Motor: No weakness, tremor, atrophy, abnormal muscle tone or seizure activity. Coordination: Coordination normal.      Gait: Gait normal.      Deep Tendon Reflexes: Reflexes are normal and symmetric. Reflexes normal. Babinski sign absent on the right side. Babinski sign absent on the left side. Reflex Scores:       Tricep reflexes are 2+ on the right side and 2+ on the left side. Bicep reflexes are 2+ on the right side and 2+ on the left side. Brachioradialis reflexes are 2+ on the right side and 2+ on the left side. Patellar reflexes are 2+ on the right side and 2+ on the left side. Achilles reflexes are 2+ on the right side and 2+ on the left side. Psychiatric:         Attention and Perception: Attention and perception normal.         Mood and Affect: Mood and affect normal.         Speech: Speech normal.         Behavior: Behavior normal.         Thought Content: Thought content normal.         Cognition and Memory: Cognition and memory normal.         Judgment: Judgment normal.      Comments: Depression seem much improved from last yr         Assessment:      Problem   Annual Physical Exam   Attention Deficit Hyperactivity Disorder (Adhd), Predominantly Inattentive Type. Stable w/ meds. Essential Hypertension, Benign. Good w/ meds.     Pure Hypercholesterolemia. Some wt gain. Labs pending. Depression With Anxiety. Doing well w/ meds. Plantar Fasciitis of Left Foot. Much improved w/ tx. Umbilical Hernia Without Obstruction and Without Gangrene. Resolved w/ surgical repair. Plan:      All above problems reviewed and the found to be unchanged except for the following :     Annual Physical Exam. Maintain of decrease alcohol use. Call if new c/o. Attention Deficit Hyperactivity Disorder (Adhd), Predominantly Inattentive Type. Continue meds. Call if new c/o. Essential Hypertension, Benign. Continue present meds. Home BP checks. Call if>140/90. Improve diet. Avoid caffeine and salt. Call if new c/o w/ meds. Pure Hypercholesterolemia. Will do labs and adjust med if needed. Depression With Anxiety. Continue meds and call if new c/o. Plantar Fasciitis of Left Foot. Continue stretching exercise prior to exercise. Umbilical Hernia Without Obstruction and Without Gangrene. Resolved.              Jase Venegas MD

## 2020-02-10 NOTE — ASSESSMENT & PLAN NOTE
Prostate: na  Colonoscopy: na  DEXA: na  Eye: 2019  Hearing: passed  Immunization: up to date.   MMSE: na  Get Up and Go: na  Tob: nonsmoker  ETOH: 10-12 beers/week  Caffeine: moderate  Cardiac Risk Assessment: pending  Living will: no  Medical power of : no

## 2020-02-10 NOTE — ASSESSMENT & PLAN NOTE
No change in meds, no c/o with meds, no chest pain, SOB, palpatations, or syncope. Home bp was 110-130s/70-80s.

## 2020-04-19 ENCOUNTER — PATIENT MESSAGE (OUTPATIENT)
Dept: FAMILY MEDICINE CLINIC | Age: 35
End: 2020-04-19

## 2020-04-20 NOTE — TELEPHONE ENCOUNTER
From: Kristin Khoury  To: Saige Streeter MD  Sent: 4/19/2020 10:50 AM EDT  Subject: Prescription Question    How can i obtain my prescription? I need to have my vyvance Rx filled but i saw the [facility] the office was closed. Please let me know when possible.  Thank you, hope you are all doing well

## 2020-05-20 ENCOUNTER — PATIENT MESSAGE (OUTPATIENT)
Dept: FAMILY MEDICINE CLINIC | Age: 35
End: 2020-05-20

## 2020-05-20 RX ORDER — CYCLOBENZAPRINE HCL 10 MG
10 TABLET ORAL 3 TIMES DAILY PRN
Qty: 30 TABLET | Refills: 0 | Status: SHIPPED | OUTPATIENT
Start: 2020-05-20 | End: 2020-05-30

## 2020-05-20 NOTE — TELEPHONE ENCOUNTER
From: Jose A Wick  To: Drake Powers MD  Sent: 5/20/2020 2:38 PM EDT  Subject: Non-Urgent Medical Question    My back is out again. I can barely walk and I am in excruciating pain even when laying down. I have been taking naproxen for two days and have tried stretching however I am no longer able to try and do so. I have tired ice, I have also tried heat more recently. Pain is lower back, primarily right side down the leg and left side is not as bad. Can you prescribe something to help loosen it up or at least help with pain? I am available any time. [phone].  Thank you

## 2020-05-22 RX ORDER — FENOFIBRATE 160 MG/1
TABLET ORAL
Qty: 90 TABLET | Refills: 1 | Status: SHIPPED | OUTPATIENT
Start: 2020-05-22 | End: 2020-12-09

## 2020-05-22 RX ORDER — ROSUVASTATIN CALCIUM 40 MG/1
TABLET, COATED ORAL
Qty: 90 TABLET | Refills: 1 | Status: SHIPPED | OUTPATIENT
Start: 2020-05-22 | End: 2020-12-09

## 2020-05-22 RX ORDER — AMLODIPINE BESYLATE AND BENAZEPRIL HYDROCHLORIDE 5; 20 MG/1; MG/1
CAPSULE ORAL
Qty: 90 CAPSULE | Refills: 1 | Status: SHIPPED | OUTPATIENT
Start: 2020-05-22 | End: 2020-12-09

## 2020-05-22 RX ORDER — EZETIMIBE 10 MG/1
TABLET ORAL
Qty: 90 TABLET | Refills: 1 | Status: SHIPPED | OUTPATIENT
Start: 2020-05-22 | End: 2020-12-09

## 2020-06-09 ENCOUNTER — PATIENT MESSAGE (OUTPATIENT)
Dept: FAMILY MEDICINE CLINIC | Age: 35
End: 2020-06-09

## 2020-06-15 ENCOUNTER — TELEPHONE (OUTPATIENT)
Dept: FAMILY MEDICINE CLINIC | Age: 35
End: 2020-06-15

## 2020-06-15 NOTE — TELEPHONE ENCOUNTER
Patient calling to say that the prescription for the Vyvanse should have gone to [facility] on [address] in [place]. He is no longer covered under [employer].

## 2020-07-15 RX ORDER — TRAZODONE HYDROCHLORIDE 50 MG/1
TABLET ORAL
Qty: 90 TABLET | Refills: 1 | Status: SHIPPED | OUTPATIENT
Start: 2020-07-15

## 2020-08-10 ENCOUNTER — HOSPITAL ENCOUNTER (OUTPATIENT)
Dept: GENERAL RADIOLOGY | Age: 35
Discharge: HOME | End: 2020-08-10
Payer: COMMERCIAL

## 2020-08-10 ENCOUNTER — OFFICE VISIT (OUTPATIENT)
Dept: FAMILY MEDICINE CLINIC | Age: 35
End: 2020-08-10
Payer: COMMERCIAL

## 2020-08-10 VITALS
TEMPERATURE: 97.3 F | RESPIRATION RATE: 16 BRPM | WEIGHT: 210 LBS | SYSTOLIC BLOOD PRESSURE: 118 MMHG | BODY MASS INDEX: 27.83 KG/M2 | HEART RATE: 60 BPM | HEIGHT: 73 IN | OXYGEN SATURATION: 99 % | DIASTOLIC BLOOD PRESSURE: 76 MMHG

## 2020-08-10 PROCEDURE — 99214 OFFICE O/P EST MOD 30 MIN: CPT | Performed by: INTERNAL MEDICINE

## 2020-08-10 PROCEDURE — 72100 X-RAY EXAM L-S SPINE 2/3 VWS: CPT

## 2020-08-10 ASSESSMENT — ENCOUNTER SYMPTOMS
BACK PAIN: 1
GASTROINTESTINAL NEGATIVE: 1
ALLERGIC/IMMUNOLOGIC NEGATIVE: 1
RESPIRATORY NEGATIVE: 1

## 2020-08-10 NOTE — ASSESSMENT & PLAN NOTE
Has improved w/ med. No recent change in meds or c/o. Sleeping better. Not seeing Counselor over last yr.

## 2020-08-10 NOTE — PATIENT INSTRUCTIONS
All above problems reviewed and the found to be unchanged except for the following :     Attention Deficit Hyperactivity Disorder (Adhd), Predominantly Inattentive Type. Continue meds. Call if new c/o. Essential Hypertension, Benign. Continue present meds. Home BP checks. Call if>140/90. Improve diet. Avoid caffeine and salt. Call if new c/o w/ meds. Pure Hypercholesterolemia. Will do labs and adjust meds if needed. To improve diet and exercise. To lose a little more weight. Call if need further assistance. Call if new c/o w/ meds. Next lab is due 6 mo     Depression With Anxiety. Continue meds. Call if new c/o. Plantar Fasciitis of Left Foot. To Dr Mae Nixon for evaluation and tx. Chronic Right-Sided Low Back Pain With Right-Sided Sciatica. Will do X-ray. Will do stretches. If no better may need further evaluation. Patient Education        Low Back Pain: Exercises  Introduction  Here are some examples of exercises for you to try. The exercises may be suggested for a condition or for rehabilitation. Start each exercise slowly. Ease off the exercises if you start to have pain. You will be told when to start these exercises and which ones will work best for you. How to do the exercises  Press-up   1. Lie on your stomach, supporting your body with your forearms. 2. Press your elbows down into the floor to raise your upper back. As you do this, relax your stomach muscles and allow your back to arch without using your back muscles. As your press up, do not let your hips or pelvis come off the floor. 3. Hold for 15 to 30 seconds, then relax. 4. Repeat 2 to 4 times. Alternate arm and leg (bird dog) exercise   Do this exercise slowly. Try to keep your body straight at all times, and do not let one hip drop lower than the other. 1. Start on the floor, on your hands and knees. 2. Tighten your belly muscles. 3. Raise one leg off the floor, and hold it straight out behind you.  Be careful not to let your hip drop down, because that will twist your trunk. 4. Hold for about 6 seconds, then lower your leg and switch to the other leg. 5. Repeat 8 to 12 times on each leg. 6. Over time, work up to holding for 10 to 30 seconds each time. 7. If you feel stable and secure with your leg raised, try raising the opposite arm straight out in front of you at the same time. Knee-to-chest exercise   1. Lie on your back with your knees bent and your feet flat on the floor. 2. Bring one knee to your chest, keeping the other foot flat on the floor (or keeping the other leg straight, whichever feels better on your lower back). 3. Keep your lower back pressed to the floor. Hold for at least 15 to 30 seconds. 4. Relax, and lower the knee to the starting position. 5. Repeat with the other leg. Repeat 2 to 4 times with each leg. 6. To get more stretch, put your other leg flat on the floor while pulling your knee to your chest.    Curl-ups   1. Lie on the floor on your back with your knees bent at a 90-degree angle. Your feet should be flat on the floor, about 12 inches from your buttocks. 2. Cross your arms over your chest. If this bothers your neck, try putting your hands behind your neck (not your head), with your elbows spread apart. 3. Slowly tighten your belly muscles and raise your shoulder blades off the floor. 4. Keep your head in line with your body, and do not press your chin to your chest.  5. Hold this position for 1 or 2 seconds, then slowly lower yourself back down to the floor. 6. Repeat 8 to 12 times. Pelvic tilt exercise   1. Lie on your back with your knees bent. 2. \"Brace\" your stomach. This means to tighten your muscles by pulling in and imagining your belly button moving toward your spine. You should feel like your back is pressing to the floor and your hips and pelvis are rocking back. 3. Hold for about 6 seconds while you breathe smoothly. 4. Repeat 8 to 12 times.     Heel dig bridging

## 2020-08-10 NOTE — ASSESSMENT & PLAN NOTE
No c/o w/ meds. Last labs(2/2020) show slight increase Cholesterol and TGs compared to prior lab.lost 8 lbs since last labs.

## 2020-08-10 NOTE — PROGRESS NOTES
Subjective:      Patient ID: Leigha Brown is a 38 y.o. male. HPI  Essential hypertension, benign  No change in meds, no c/o with meds, no chest pain, SOB, palpatations, or syncope. Home bp was 110-130s/70-80s. Pure hypercholesterolemia   No c/o w/ meds. Last labs(2/2020) show slight increase Cholesterol and TGs compared to prior lab.lost 8 lbs since last labs. Depression with anxiety  Has improved w/ med. No recent change in meds or c/o. Sleeping better. Not seeing Counselor over last yr. Attention deficit hyperactivity disorder (ADHD), predominantly inattentive type  Dx by Psych and started on Vyvance and doing better. Chronic right-sided low back pain with right-sided sciatica  Chronic since 2009. Has 2-3 episodes a yr. Lasts 2-3 weeks. Back pain w/ R Sciatica. Plantar fasciitis of left foot  Started 2 yr ago w/ pain first in am worse after walking distance. Some better w/ stretches and ice. Has improved w/ increased exercise. Now not noticeable. Past Medical History:   Diagnosis Date    Chronic back pain     Hyperlipidemia     Hypertension     Prolonged emergence from general anesthesia     Umbilical hernia      Current Outpatient Medications   Medication Sig Dispense Refill    traZODone (DESYREL) 50 MG tablet TAKE 1 TABLET BY MOUTH EVERY DAY AT BEDTIME AS NEEDED FOR SLEEP 90 tablet 1    lisdexamfetamine (VYVANSE) 50 MG capsule Take 1 capsule by mouth every morning for 30 days. 30 capsule 0    fenofibrate (TRIGLIDE) 160 MG tablet TAKE 1 TABLET BY MOUTH EVERY DAY 90 tablet 1    amLODIPine-benazepril (LOTREL) 5-20 MG per capsule TAKE 1 CAPSULE BY MOUTH EVERY DAY 90 capsule 1    ezetimibe (ZETIA) 10 MG tablet TAKE 1 TABLET BY MOUTH EVERY DAY 90 tablet 1    rosuvastatin (CRESTOR) 40 MG tablet TAKE 1 TABLET BY MOUTH EVERY DAY 90 tablet 1    propranolol (INDERAL) 10 MG tablet TAKE 1 TAB DAILY AS NEEDED. TAKE 1-2 TABS (10-20MG) 30MIN PRIOR TO ANXIETY PROVOKING SITUATION.  180 tablet 1  buPROPion (WELLBUTRIN XL) 150 MG extended release tablet TAKE 1 TABLET BY MOUTH EVERY DAY IN THE MORNING. TAKE WITH 300 MG FOR TOTAL 450 MG DOSE 90 tablet 1    buPROPion (WELLBUTRIN XL) 300 MG extended release tablet TAKE 1 TABLET BY MOUTH EVERY MORNING. TAKE WITH 150MG TAB FOR TOTAL OF 450MGDAILY 90 tablet 1     No current facility-administered medications for this visit. No Known Allergies  Social History     Tobacco Use    Smoking status: Former Smoker     Packs/day: 0.50     Years: 9.00     Pack years: 4.50     Types: Cigarettes     Last attempt to quit: 2011     Years since quittin.9    Smokeless tobacco: Current User     Types: Chew   Substance Use Topics    Alcohol use: Yes     Alcohol/week: 6.0 standard drinks     Types: 2 Glasses of wine, 2 Cans of beer, 2 Shots of liquor per week     Comment: daily     Family History   Problem Relation Age of Onset    High Blood Pressure Father     High Cholesterol Father     Asthma Brother     Cancer Paternal Grandfather     High Blood Pressure Paternal Grandfather     High Cholesterol Paternal Grandfather     Diabetes Neg Hx     Emphysema Neg Hx     Heart Failure Neg Hx     Hypertension Neg Hx        Review of Systems   Constitutional: Positive for fatigue. Respiratory: Negative. Cardiovascular: Negative. Gastrointestinal: Negative. Endocrine: Negative. Genitourinary: Negative. Musculoskeletal: Positive for back pain, gait problem and myalgias. Skin: Negative. Allergic/Immunologic: Negative. Hematological: Negative. Psychiatric/Behavioral: Positive for dysphoric mood and sleep disturbance. The patient is nervous/anxious. Vitals:    08/10/20 0750 08/10/20 0813   BP: 116/86 118/76   Pulse: 60    Resp: 16    Temp: 97.3 °F (36.3 °C)    SpO2: 99%    Weight: 210 lb (95.3 kg)    Height: 6' 1\" (1.854 m)      Objective:   Physical Exam  Constitutional:       General: He is not in acute distress.      Appearance: Normal appearance. He is well-developed. He is obese. He is not ill-appearing, toxic-appearing or diaphoretic. HENT:      Head: Normocephalic and atraumatic. Eyes:      Extraocular Movements: Extraocular movements intact. Conjunctiva/sclera: Conjunctivae normal.      Pupils: Pupils are equal, round, and reactive to light. Neck:      Musculoskeletal: Full passive range of motion without pain, normal range of motion and neck supple. Thyroid: No thyroid mass or thyromegaly. Vascular: Normal carotid pulses. No carotid bruit, hepatojugular reflux or JVD. Trachea: Trachea and phonation normal.   Cardiovascular:      Rate and Rhythm: Normal rate and regular rhythm. No extrasystoles are present. Chest Wall: PMI is not displaced. Pulses: Normal pulses. No decreased pulses. Carotid pulses are 2+ on the right side and 2+ on the left side. Radial pulses are 2+ on the right side and 2+ on the left side. Femoral pulses are 2+ on the right side and 2+ on the left side. Popliteal pulses are 2+ on the right side and 2+ on the left side. Dorsalis pedis pulses are 2+ on the right side and 2+ on the left side. Posterior tibial pulses are 2+ on the right side and 2+ on the left side. Heart sounds: Normal heart sounds. No murmur. No friction rub. No gallop. Pulmonary:      Effort: Pulmonary effort is normal. No tachypnea, bradypnea, accessory muscle usage or respiratory distress. Breath sounds: Normal breath sounds. No decreased breath sounds, wheezing, rhonchi or rales. Abdominal:      Hernia: No hernia is present. There is no hernia in the ventral area. Musculoskeletal:         General: No tenderness (mild straight leg lift pain R side. ). Right lower leg: No edema. Left lower leg: No edema. Skin:     General: Skin is warm and dry. Capillary Refill: Capillary refill takes less than 2 seconds.       Coloration: Skin is not jaundiced or pale. Findings: No abrasion, bruising, erythema, lesion or rash. Nails: There is no clubbing. Neurological:      General: No focal deficit present. Mental Status: He is alert and oriented to person, place, and time. Mental status is at baseline. He is not disoriented. Cranial Nerves: No cranial nerve deficit. Sensory: No sensory deficit. Motor: No weakness, tremor, atrophy or abnormal muscle tone. Coordination: Coordination normal.      Gait: Gait normal.   Psychiatric:         Mood and Affect: Mood normal.         Speech: Speech normal.         Behavior: Behavior normal.         Thought Content: Thought content normal.         Judgment: Judgment normal.         Assessment:      Problem   Attention Deficit Hyperactivity Disorder (Adhd), Predominantly Inattentive Type. Stable w/ meds. Essential Hypertension, Benign. Good w/ meds. Pure Hypercholesterolemia. Lost 8 lbs. Depression With Anxiety. Has done well w/ present meds   Plantar Fasciitis of Left Foot. Not improving   Chronic Right-Sided Low Back Pain With Right-Sided Sciatica. Chronic and reoccurring. Plan:        All above problems reviewed and the found to be unchanged except for the following :     Attention Deficit Hyperactivity Disorder (Adhd), Predominantly Inattentive Type. Continue meds. Call if new c/o. Essential Hypertension, Benign. Continue present meds. Home BP checks. Call if>140/90. Improve diet. Avoid caffeine and salt. Call if new c/o w/ meds. Pure Hypercholesterolemia. Will do labs and adjust meds if needed. To improve diet and exercise. To lose a little more weight. Call if need further assistance. Call if new c/o w/ meds. Next lab is due 6 mo     Depression With Anxiety. Continue meds. Call if new c/o. Plantar Fasciitis of Left Foot. To Dr Larence Schirmer for evaluation and tx. Chronic Right-Sided Low Back Pain With Right-Sided Sciatica. Will do X-ray. Will do stretches. If no better may need further evaluation.        Darian Hernadez MD

## 2020-08-10 NOTE — ASSESSMENT & PLAN NOTE
Started 2 yr ago w/ pain first in am worse after walking distance. Some better w/ stretches and ice. Has improved w/ increased exercise. Now not noticeable.

## 2020-08-17 RX ORDER — BUPROPION HYDROCHLORIDE 300 MG/1
TABLET ORAL
Qty: 90 TABLET | Refills: 1 | Status: SHIPPED | OUTPATIENT
Start: 2020-08-17 | End: 2021-03-18

## 2020-08-17 RX ORDER — BUPROPION HYDROCHLORIDE 150 MG/1
TABLET ORAL
Qty: 90 TABLET | Refills: 1 | Status: SHIPPED | OUTPATIENT
Start: 2020-08-17 | End: 2021-03-18

## 2020-09-08 ENCOUNTER — OFFICE VISIT (OUTPATIENT)
Dept: ORTHOPEDIC SURGERY | Age: 35
End: 2020-09-08
Payer: COMMERCIAL

## 2020-09-08 VITALS — WEIGHT: 210 LBS | HEIGHT: 73 IN | BODY MASS INDEX: 27.83 KG/M2

## 2020-09-08 PROCEDURE — 20550 NJX 1 TENDON SHEATH/LIGAMENT: CPT | Performed by: ORTHOPAEDIC SURGERY

## 2020-09-08 PROCEDURE — 99243 OFF/OP CNSLTJ NEW/EST LOW 30: CPT | Performed by: ORTHOPAEDIC SURGERY

## 2020-09-08 RX ORDER — TRIAMCINOLONE ACETONIDE 40 MG/ML
80 INJECTION, SUSPENSION INTRA-ARTICULAR; INTRAMUSCULAR ONCE
Status: COMPLETED | OUTPATIENT
Start: 2020-09-08 | End: 2020-09-08

## 2020-09-08 RX ORDER — MELOXICAM 15 MG/1
15 TABLET ORAL DAILY
Qty: 30 TABLET | Refills: 0 | Status: SHIPPED | OUTPATIENT
Start: 2020-09-08 | End: 2020-09-30

## 2020-09-08 RX ORDER — PREDNISONE 10 MG/1
TABLET ORAL
Qty: 14 TABLET | Refills: 0 | Status: SHIPPED | OUTPATIENT
Start: 2020-09-08 | End: 2021-03-08 | Stop reason: ALTCHOICE

## 2020-09-08 RX ADMIN — TRIAMCINOLONE ACETONIDE 80 MG: 40 INJECTION, SUSPENSION INTRA-ARTICULAR; INTRAMUSCULAR at 15:18

## 2020-09-08 NOTE — PROGRESS NOTES
Chief Complaint    No chief complaint on file. History of Present Illness:  Gregory Ramirez is a 38 y.o. male who I was asked to see in consultation by Dr. David Espino for evaluation of they are chief complaint of left medial heel pain greater than right. He states this is been going on for at least 2 years. No significant trauma that he can remember which is gradually got worse over time. It started as pain in his left plantar arch and then eventually that resolved and now he has pain on the plantar medial left heel greater than right. Rates his pain at 6 out of 10. He thinks this is probably due to his sporting activities in the past.  Walking and running make this worse ice and stretching make it some better. Medical History:  Patient's medications, allergies, past medical, surgical, social and family histories were reviewed and updated as appropriate. Review of Systems:  Pertinent items are noted in HPI  Review of systems reviewed from Patient History Form dated on 9/8/2020 and available in the patient's chart under the Media tab. Vital Signs: There were no vitals taken for this visit. General Exam:   Constitutional: Patient is adequately groomed with no evidence of malnutrition  DTRs: Deep tendon reflexes are intact  Mental Status: The patient is oriented to time, place and person. The patient's mood and affect are appropriate. Lymphatic: The lymphatic examination bilaterally reveals all areas to be without enlargement or induration. Ankle Examination:    Inspection: No significant swelling erythema or ecchymosis    Palpation: Obvious hypertrophy of the medial plantar fascial band on the left foot greater than right.     Range of Motion: Tight gastrocs on the right greater than left    Strength: Strength is within normal limits in both lower extremities    Special Tests: Negative Tinel's through the tarsal tunnel on the left    Skin: There are no rashes, ulcerations or lesions. Gait: Antalgic    Reflex 2+ and symmetric    Additional Comments:       Additional Examinations:         Right Lower Extremity: Examination of the right lower extremity does not show any tenderness, deformity or injury. Range of motion is unremarkable. There is no gross instability. There are no rashes, ulcerations or lesions. Strength and tone are normal.     Radiology:     X-rays obtained and reviewed in office:  Views 3  Location left foot  Impression shows no evidence of significant osseous lesion    Assessment : This patient has plantar fasciitis left greater than right      Office Procedures:  No orders of the defined types were placed in this encounter. Treatment Plan:  The etiology of plantar fasciitis was discussed in great detail including the nonoperative and operative options. All questions were answered. Nonoperative option includes activity modification, immobilization with cast or boot, support using brace shoes and inserts, medicines when appropriate, physical therapy, injections when indicated, and topicals. Operative option includes none at this time. The patient will start on the following treatment regimen: I wrote out his plan of care and copied it to the media section of his chart. I would recommend that he get a boot on his own I put him on prednisone and then he will switch to meloxicam and we discussed the side effects of these medicines. I injected his plantar medial left heel with Marcaine lidocaine and Kenalog 4/4/2 cc for plantar fasciitis. He tolerated this well all questions were answered no guarantees were given or implied I will see him back as needed he will come out of his boot in 10 days and if his pain recurs I would scan him and will followup with me as needed    I have evaluated the patient myself and completed the examination of the patient on date of visit.  Have discussed the case and reviewed all pertinent data with the patient

## 2020-12-09 RX ORDER — FENOFIBRATE 160 MG/1
TABLET ORAL
Qty: 90 TABLET | Refills: 1 | Status: SHIPPED | OUTPATIENT
Start: 2020-12-09 | End: 2021-03-18 | Stop reason: SDUPTHER

## 2020-12-09 RX ORDER — ROSUVASTATIN CALCIUM 40 MG/1
TABLET, COATED ORAL
Qty: 90 TABLET | Refills: 1 | Status: SHIPPED | OUTPATIENT
Start: 2020-12-09 | End: 2021-03-18 | Stop reason: SDUPTHER

## 2020-12-09 RX ORDER — EZETIMIBE 10 MG/1
TABLET ORAL
Qty: 90 TABLET | Refills: 1 | Status: SHIPPED | OUTPATIENT
Start: 2020-12-09 | End: 2021-03-18 | Stop reason: SDUPTHER

## 2020-12-09 RX ORDER — AMLODIPINE BESYLATE AND BENAZEPRIL HYDROCHLORIDE 5; 20 MG/1; MG/1
CAPSULE ORAL
Qty: 90 CAPSULE | Refills: 1 | Status: SHIPPED | OUTPATIENT
Start: 2020-12-09 | End: 2021-03-18 | Stop reason: SDUPTHER

## 2020-12-09 NOTE — TELEPHONE ENCOUNTER
Refill Request AMLODIPINE-BENAZEPRIL 5-20 MG , FENOFIBRATE 160 MG TABLET , EZETIMIBE 10 MG TABLET , and ROSUVASTATIN CALCIUM 40 MG TAB     Last Seen: 8/10/2020    Last Written: 5/22/20 90 capsules with 1 refill amlodipine  5/22/20 fenofibrate 90 tablets with 1 refill  5/22/20 ezetimibe 90 tablets with 1 refill  5/22/20 rosuvastatin 90 tablets with 1 refill      Next Appointment:   Future Appointments   Date Time Provider Vipin Leyva   1/5/2021  8:00 AM Brie Larsen [facility]           Requested Prescriptions     Pending Prescriptions Disp Refills    amLODIPine-benazepril (LOTREL) 5-20 MG per capsule [Pharmacy Med Name: AMLODIPINE-BENAZEPRIL 5-20 MG] 90 capsule 1     Sig: TAKE 1 CAPSULE BY MOUTH EVERY DAY    fenofibrate (TRIGLIDE) 160 MG tablet [Pharmacy Med Name: FENOFIBRATE 160 MG TABLET] 90 tablet 1     Sig: TAKE 1 TABLET BY MOUTH EVERY DAY    ezetimibe (ZETIA) 10 MG tablet [Pharmacy Med Name: EZETIMIBE 10 MG TABLET] 90 tablet 1     Sig: TAKE 1 TABLET BY MOUTH EVERY DAY    rosuvastatin (CRESTOR) 40 MG tablet [Pharmacy Med Name: ROSUVASTATIN CALCIUM 40 MG TAB] 90 tablet 1     Sig: TAKE 1 TABLET BY MOUTH EVERY DAY

## 2021-01-04 NOTE — PATIENT INSTRUCTIONS
Sun Protection Tips    Apply broad spectrum water resistant sunscreen with an SPF of at least 30 to exposed areas of the skin. Dont forget the ears and lips! Remember to reapply sunscreen about every 2 hours and after swimming or sweating. Wear sun protective clothing. Swim shirts (aka. rash guards) are a great idea and negates the need to reapply sunscreen in those areas. Seek the shade whenever possible especially between the hours of 10am and 4pm when the suns rays are the strongest.     Avoid tanning beds          Wear a wide brim hat while in the sun    Biopsy Wound Care Instructions  ? Cleanse the wound twice a day with mild soapy water. ? Gently dry the area. ? Apply Vaseline/Aquaphor to the wound using a cotton tipped applicator or Qtip. ? Cover with a clean bandage. ? Repeat this process until the biopsy site is healed. You will know when it's healed when it's pink and shiny. ? You may shower and bathe as usual.      If bleeding should occur, apply firm pressure to bleeding area for 15 minutes and repeat if needed. If bleeding continues after 30 minutes, please call office and ask to speak to Candelaria.        ** Biopsy results generally take around 7-10  business days to come back. A member of Dr. Jailene Kaminski staff will call you when the results are have been reviewed and a personalized treatment plan has been established. If you don't hear from our staff after the 10 business days, please call our office for your results. Thanks for your visit! Feel free to send  Dr. Carlin Payton assistant, Candelaria, a [employer] message for any questions, concerns or  to schedule your cosmetic procedures.

## 2021-01-05 ENCOUNTER — OFFICE VISIT (OUTPATIENT)
Dept: DERMATOLOGY | Age: 36
End: 2021-01-05
Payer: COMMERCIAL

## 2021-01-05 VITALS — TEMPERATURE: 98.6 F

## 2021-01-05 DIAGNOSIS — D48.9 NEOPLASM OF UNCERTAIN BEHAVIOR: Primary | ICD-10-CM

## 2021-01-05 DIAGNOSIS — D22.9 MULTIPLE BENIGN NEVI: ICD-10-CM

## 2021-01-05 DIAGNOSIS — L72.11 PILAR CYST: ICD-10-CM

## 2021-01-05 DIAGNOSIS — Z86.018 HISTORY OF DYSPLASTIC NEVUS: ICD-10-CM

## 2021-01-05 PROCEDURE — 99213 OFFICE O/P EST LOW 20 MIN: CPT | Performed by: DERMATOLOGY

## 2021-01-05 PROCEDURE — 11102 TANGNTL BX SKIN SINGLE LES: CPT | Performed by: DERMATOLOGY

## 2021-01-05 NOTE — PROGRESS NOTES
[facility]) Dermatology  [address], [place], [address]       Nita Jacques  1985    38 y.o. male     Date of Visit: 2021    Chief Complaint:   Chief Complaint   Patient presents with    Skin Exam     moles, Teresa Church        I was asked to see this patient by Dr. Castillo ref. provider found. History of Present Illness:  Nita Jacques is a 38 y.o. male who presents with the chief complaint of the followin. Patient requests waist up skin cancer screening exam only today. Denies skin concerns to lower body. Many year history of multiple nevi on the head/neck, trunk and upper extremities, all present for many years. Denies new moles. Denies moles changing in size, shape, color. None associated w/ pain, bleeding, pruritus. Pt denies any changing or concerning lesions, denies any new concerns. Denies any new or changing pigmented lesions. 2.  History of dysplastic nevus located to right paraspinal mid back status post surgical excision, denies recurrence. 3.  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 Denies bleeding, drainage, pain or tenderness. Admits to sun exposure in youth without wearing sunscreen, hats, or protective clothing. Currently practices sun avoidance, does wear SPF 50 sunscreen when outdoors in the summertime, [place] about once per year. Review of Systems:  Constitutional: Reports general sense of well-being   Skin: No new or changing moles, no tendency to develop thick scars, no interval of severe sunburns  Heme: No abnormal bruising or bleeding. Past Skin Hx:  -2018-history of moderately dysplastic nevus status post surgical excision located to right paraspinal back  Patient denies past history of melanoma, NMSC, or chronic skin rashes.     PFHx: Denies hx of MM or NMSC    ADDITIONAL HISTORY:  traZODone (DESYREL) 50 MG tablet TAKE 1 TABLET BY MOUTH EVERY DAY AT BEDTIME AS NEEDED FOR SLEEP 90 tablet 1    propranolol (INDERAL) 10 MG tablet TAKE 1 TAB DAILY AS NEEDED. TAKE 1-2 TABS (10-20MG) 30MIN PRIOR TO ANXIETY PROVOKING SITUATION. 180 tablet 1       Social History:   Social History     Socioeconomic History    Marital status:      Spouse name: Not on file    Number of children: Not on file    Years of education: Not on file    Highest education level: Not on file   Occupational History    Not on file   Social Needs    Financial resource strain: Not on file    Food insecurity     Worry: Not on file     Inability: Not on file    Transportation needs     Medical: Not on file     Non-medical: Not on file   Tobacco Use    Smoking status: Former Smoker     Packs/day: 0.50     Years: 9.00     Pack years: 4.50     Types: Cigarettes     Quit date: 2011     Years since quittin.3    Smokeless tobacco: Current User     Types: Chew   Substance and Sexual Activity    Alcohol use:  Yes     Alcohol/week: 6.0 standard drinks     Types: 2 Glasses of wine, 2 Cans of beer, 2 Shots of liquor per week     Comment: daily    Drug use: No    Sexual activity: Not on file   Lifestyle    Physical activity     Days per week: Not on file     Minutes per session: Not on file    Stress: Not on file   Relationships    Social connections     Talks on phone: Not on file     Gets together: Not on file     Attends Ca[employer] service: Not on file     Active member of club or organization: Not on file     Attends meetings of clubs or organizations: Not on file     Relationship status: Not on file    Intimate partner violence     Fear of current or ex partner: Not on file     Emotionally abused: Not on file     Physically abused: Not on file     Forced sexual activity: Not on file   Other Topics Concern    Not on file   Social History Narrative    Not on file       Physical Examination The following were examined and determined to be normal: Psych/Neuro, Scalp/hair, Head/face, Conjunctivae/eyelids, Gums/teeth/lips, Neck, Breast/axilla/chest, Abdomen, RUE, LUE and Nails/digits. The following were examined and determined to be abnormal: Back. Richey phototype: 2    -Constitutional: Well appearing, no acute distress  -Neurological: Alert and oriented X 3  -Mood and Affect: Pleasant  Areas of skin examined as listed above:   1. Right medial scapular back-0.6 x 0.5 cm pink-brown erythematous asymmetrical papule      Scattered on the head,neck, trunk and extremities are multiple well-defined round and oval symmetric  moothly-bordered uniformly brown macules and papules. no change in size/shape/color of any lesions; no bleeding lesions. 2. Vertex scalp-1.0 cm smooth subcutaneous mobile nodule, nontender to palpation  3. Right paraspinal back-atrophic linear scar at site of prior dysplastic nevus, clear  Assessment and Plan     1. Neoplasm of uncertain behavior    2. Multiple benign nevi    3. Pilar cyst    4. History of dysplastic nevus      1. Neoplasm of uncertain behavior  DDx: Irritated dermal nevus rule out dysplastic nevus  -Discussed possible diagnosis. Patient agreeable to biopsy. Verbal consent obtained after risks (infection, bleeding, scar, dyspigmentation), benefits and alternatives explained. -Area(s) to be biopsied were marked with a surgical pen. Site(s) were cleansed with alcohol. Local anesthesia achieved with 1% lidocaine with epinephrine/sodium bicarbonate. Shave biopsy performed with a razor blade. Hemostasis was achieved with aluminum chloride. The wound(s) were dressed with petrolatum and covered with a bandage. Specimen(s) sent to pathology. Pt educated re: risk of bleeding, infection, scar, discoloration, and wound care instructions.     2. Multiple benign nevi  Benign acquired melanocytic nevi  -Recommend monthly self skin exams -Educated regarding the ABCDEs of melanoma detection   -Call for any new/changing moles or concerning lesions  -Reviewed sun protective behavior -- sun avoidance during the peak hours of the day, sun-protective clothing (including hat and sunglasses), sunscreen use (water resistant, broad spectrum, SPF at least 30, need for reapplication every 1.5 to 2 hours), avoidance of tanning beds   -Plan: Observation with annual skin checks (earlier if indicated) performed in office to monitor current nevi and to assess for new lesions. 3. Pilar cyst  -I counseled the patient regarding the following:  Pilar cysts require no specific skin care. Pilar cysts are benign sacs within the skin of the scalp that contain keratin. There can be multiple pilar cysts at one time and there also can be a genetic pre-disposition to developing these cysts. -Patient is to contact the office if the pilar cyst ruptures, becomes inflamed/red, or tender. pt. Informed definitive treatment is surgical excision  -Reassurance and observation      4. History of dysplastic nevus  No evidence of recurrence  -Reviewed clinical significance of dysplastic nevi, which are markers for an increased risk for the development of melanoma. Although melanoma may sometimes develop within a dysplastic nevus, it more commonly develops de evens. Dysplastic nevi with moderate or severe cytologic atypia should be excised with clear margins. Recommend at least annual skin exams, earlier if notices suspicious new growths or changes. Return to Clinic:  1 year skin exam  Discussed plan with patient and/or primary caretaker. Patient to call clinic with any questions / concerns. Reviewed proper use and side effects of treatment(s) and/or medication(s) with patient and/or primary caretaker. AVS provided or is available on [employer]     Note is transcribed using voice recognition software. Inadvertent computerized transcription errors may be present.

## 2021-01-08 DIAGNOSIS — F90.0 ATTENTION DEFICIT HYPERACTIVITY DISORDER (ADHD), PREDOMINANTLY INATTENTIVE TYPE: ICD-10-CM

## 2021-01-11 NOTE — TELEPHONE ENCOUNTER
Eden Ballesteros is requesting refill(s)   Last OV 08/10/20 (pertaining to medication)  LR 12/1/20 (per medication requested)  Next office visit scheduled or attempted Yes   If no, reason:  Due mid feb- lmor

## 2021-01-19 ENCOUNTER — TELEPHONE (OUTPATIENT)
Dept: DERMATOLOGY | Age: 36
End: 2021-01-19

## 2021-01-19 NOTE — TELEPHONE ENCOUNTER
Dermatology Pathology Report 01/05/2021 12:00 AM [facility]   SEE COMMENTS    Comment:   RESULTS   DIAGNOSIS   DIAGNOSIS:   RIGHT MEDIAL SCAPULAR BACK-   Compound nevus (see description)   There are nests of benign nevus cells at the dermal-   epidermal junction and within the dermis.  There are   congenital features present.     RESULTS SIGNATURE   Vinny Garay. Reji Cohen MD   Electronic Signature: 07 JAN 2021 12:50 PM   [facility] FINAL   [facility] [place] [address]. [phone]. [phone]. Medical Director: Pablo Rocha.    Hadley Jung, [address] [id number]

## 2021-02-22 DIAGNOSIS — F90.0 ATTENTION DEFICIT HYPERACTIVITY DISORDER (ADHD), PREDOMINANTLY INATTENTIVE TYPE: ICD-10-CM

## 2021-03-08 ENCOUNTER — OFFICE VISIT (OUTPATIENT)
Dept: FAMILY MEDICINE CLINIC | Age: 36
End: 2021-03-08
Payer: COMMERCIAL

## 2021-03-08 VITALS
HEIGHT: 73 IN | RESPIRATION RATE: 16 BRPM | BODY MASS INDEX: 28.39 KG/M2 | SYSTOLIC BLOOD PRESSURE: 128 MMHG | HEART RATE: 101 BPM | TEMPERATURE: 97.4 F | WEIGHT: 214.2 LBS | OXYGEN SATURATION: 98 % | DIASTOLIC BLOOD PRESSURE: 78 MMHG

## 2021-03-08 DIAGNOSIS — Z11.59 NEED FOR HEPATITIS C SCREENING TEST: ICD-10-CM

## 2021-03-08 DIAGNOSIS — R53.83 FATIGUE, UNSPECIFIED TYPE: ICD-10-CM

## 2021-03-08 DIAGNOSIS — E78.00 PURE HYPERCHOLESTEROLEMIA: ICD-10-CM

## 2021-03-08 DIAGNOSIS — M72.2 PLANTAR FASCIITIS OF LEFT FOOT: ICD-10-CM

## 2021-03-08 DIAGNOSIS — F41.8 DEPRESSION WITH ANXIETY: ICD-10-CM

## 2021-03-08 DIAGNOSIS — I10 ESSENTIAL HYPERTENSION, BENIGN: ICD-10-CM

## 2021-03-08 DIAGNOSIS — Z00.00 ANNUAL PHYSICAL EXAM: Primary | ICD-10-CM

## 2021-03-08 PROCEDURE — 99395 PREV VISIT EST AGE 18-39: CPT | Performed by: INTERNAL MEDICINE

## 2021-03-08 SDOH — ECONOMIC STABILITY: FOOD INSECURITY: WITHIN THE PAST 12 MONTHS, YOU WORRIED THAT YOUR FOOD WOULD RUN OUT BEFORE YOU GOT MONEY TO BUY MORE.: NEVER TRUE

## 2021-03-08 SDOH — ECONOMIC STABILITY: INCOME INSECURITY: HOW HARD IS IT FOR YOU TO PAY FOR THE VERY BASICS LIKE FOOD, HOUSING, MEDICAL CARE, AND HEATING?: NOT HARD AT ALL

## 2021-03-08 ASSESSMENT — ENCOUNTER SYMPTOMS
GASTROINTESTINAL NEGATIVE: 1
RESPIRATORY NEGATIVE: 1

## 2021-03-08 NOTE — PROGRESS NOTES
Subjective:      Patient ID: Jacob London is a 38 y.o. male. HPI  Annual physical exam  Prostate: na  Colonoscopy: na  DEXA: na  Eye: 2020. Hearing: passed  Immunization: up to date. MMSE: na  Get Up and Go: na  Tob: nonsmoker  ETOH: 8-10 beers/week  Caffeine: moderate  Cardiac Risk Assessment: pending  Living will: no  Medical power of : no    Essential hypertension, benign  No change in meds, no c/o with meds, no chest pain, SOB, palpatations, or syncope. Home bp was 120s/70-80s. Pure hypercholesterolemia   No c/o w/ meds. Last labs(2/2020) show slight increase Cholesterol and TGs compared to prior lab.lost 5 lbs since last labs. Depression with anxiety  Has improved w/ med. No recent change in meds or c/o. Sleeping better. Not seeing Counselor over last yr and doing well. Plantar fasciitis of left foot  Started 2 yrs ago w/ pain first in am worse after walking distance. Some better w/ stretches and ice. Has improved w/ increased exercise. did 1 injection. Review of Systems   Constitutional: Negative. Respiratory: Negative. Cardiovascular: Negative. Gastrointestinal: Negative. Endocrine: Negative. Genitourinary: Negative. Musculoskeletal: Negative. Skin: Negative. Neurological: Negative. Hematological: Negative. Psychiatric/Behavioral: Negative. Vitals:    03/08/21 1107 03/08/21 1137   BP: 132/80 128/78   Pulse: 101    Resp: 16    Temp: 97.4 °F (36.3 °C)    SpO2: 98%    Weight: 214 lb 3.2 oz (97.2 kg)    Height: 6' 1\" (1.854 m)      Objective:   Physical Exam  Constitutional:       General: He is not in acute distress. Appearance: Normal appearance. He is well-developed. He is not ill-appearing, toxic-appearing or diaphoretic. HENT:      Head: Normocephalic and atraumatic. Right Ear: Hearing, tympanic membrane, ear canal and external ear normal. There is no impacted cerumen.       Left Ear: Hearing, tympanic membrane, ear canal and external ear normal. There is no impacted cerumen. Nose: Nose normal. No congestion or rhinorrhea. Right Sinus: No maxillary sinus tenderness or frontal sinus tenderness. Left Sinus: No maxillary sinus tenderness or frontal sinus tenderness. Mouth/Throat:      Mouth: Mucous membranes are moist.      Pharynx: Oropharynx is clear. Uvula midline. No oropharyngeal exudate or posterior oropharyngeal erythema. Eyes:      General: Lids are normal. No scleral icterus. Right eye: No discharge. Left eye: No discharge. Extraocular Movements: Extraocular movements intact. Conjunctiva/sclera: Conjunctivae normal.      Pupils: Pupils are equal, round, and reactive to light. Funduscopic exam:     Right eye: No hemorrhage, exudate, AV nicking, arteriolar narrowing or papilledema. Left eye: No hemorrhage, exudate, AV nicking, arteriolar narrowing or papilledema. Neck:      Musculoskeletal: Full passive range of motion without pain, normal range of motion and neck supple. No neck rigidity or muscular tenderness. Thyroid: No thyroid mass or thyromegaly. Vascular: Normal carotid pulses. No carotid bruit, hepatojugular reflux or JVD. Trachea: Trachea normal. No tracheal deviation. Cardiovascular:      Rate and Rhythm: Normal rate and regular rhythm. No extrasystoles are present. Chest Wall: PMI is not displaced. Pulses: Normal pulses. No decreased pulses. Carotid pulses are 2+ on the right side and 2+ on the left side. Radial pulses are 2+ on the right side and 2+ on the left side. Femoral pulses are 2+ on the right side and 2+ on the left side. Popliteal pulses are 2+ on the right side and 2+ on the left side. Dorsalis pedis pulses are 2+ on the right side and 2+ on the left side. Posterior tibial pulses are 2+ on the right side and 2+ on the left side. Heart sounds: Normal heart sounds. No murmur.  No friction deficit. Sensory: No sensory deficit. Motor: No weakness, tremor, atrophy, abnormal muscle tone or seizure activity. Coordination: Coordination normal.      Gait: Gait normal.      Deep Tendon Reflexes: Reflexes are normal and symmetric. Reflexes normal. Babinski sign absent on the right side. Babinski sign absent on the left side. Reflex Scores:       Tricep reflexes are 2+ on the right side and 2+ on the left side. Bicep reflexes are 2+ on the right side and 2+ on the left side. Brachioradialis reflexes are 2+ on the right side and 2+ on the left side. Patellar reflexes are 2+ on the right side and 2+ on the left side. Achilles reflexes are 2+ on the right side and 2+ on the left side. Psychiatric:         Mood and Affect: Mood normal.         Speech: Speech normal.         Behavior: Behavior normal.         Thought Content: Thought content normal.         Judgment: Judgment normal.         Assessment / Plan:     Annual Physical Exam. COVID vaccine as scheduled. Plantar Fasciitis of Left Foot. To Podiatrist as needed     Essential Hypertension, Benign. Continue present meds. Home BP checks. Call if>140/90. Improve diet. Avoid caffeine and salt. Call if new c/o w/ meds. Pure Hypercholesterolemia. To improve diet and exercise. Call if new c/o. Depression With Anxiety. Much improved. Continue present meds and call if new c/o.

## 2021-03-08 NOTE — ASSESSMENT & PLAN NOTE
No c/o w/ meds. Last labs(2/2020) show slight increase Cholesterol and TGs compared to prior lab.lost 5 lbs since last labs.

## 2021-03-08 NOTE — ASSESSMENT & PLAN NOTE
Prostate: na  Colonoscopy: na  DEXA: na  Eye: 2020. Hearing: passed  Immunization: up to date.   MMSE: na  Get Up and Go: na  Tob: nonsmoker  ETOH: 8-10 beers/week  Caffeine: moderate  Cardiac Risk Assessment: pending  Living will: no  Medical power of : no

## 2021-03-08 NOTE — ASSESSMENT & PLAN NOTE
Started 2 yrs ago w/ pain first in am worse after walking distance. Some better w/ stretches and ice. Has improved w/ increased exercise. did 1 injection.

## 2021-03-08 NOTE — ASSESSMENT & PLAN NOTE
Has improved w/ med. No recent change in meds or c/o. Sleeping better. Not seeing Counselor over last yr and doing well.

## 2021-03-08 NOTE — ASSESSMENT & PLAN NOTE
No change in meds, no c/o with meds, no chest pain, SOB, palpatations, or syncope. Home bp was 120s/70-80s.

## 2021-03-17 DIAGNOSIS — F90.0 ATTENTION DEFICIT HYPERACTIVITY DISORDER (ADHD), PREDOMINANTLY INATTENTIVE TYPE: ICD-10-CM

## 2021-03-18 RX ORDER — BUPROPION HYDROCHLORIDE 300 MG/1
TABLET ORAL
Qty: 90 TABLET | Refills: 1 | Status: SHIPPED | OUTPATIENT
Start: 2021-03-18 | End: 2021-10-04

## 2021-03-18 RX ORDER — BUPROPION HYDROCHLORIDE 150 MG/1
TABLET ORAL
Qty: 90 TABLET | Refills: 1 | Status: SHIPPED | OUTPATIENT
Start: 2021-03-18 | End: 2021-10-04

## 2021-04-25 DIAGNOSIS — F90.0 ATTENTION DEFICIT HYPERACTIVITY DISORDER (ADHD), PREDOMINANTLY INATTENTIVE TYPE: ICD-10-CM

## 2021-05-24 DIAGNOSIS — F90.0 ATTENTION DEFICIT HYPERACTIVITY DISORDER (ADHD), PREDOMINANTLY INATTENTIVE TYPE: ICD-10-CM

## 2021-06-28 DIAGNOSIS — F90.0 ATTENTION DEFICIT HYPERACTIVITY DISORDER (ADHD), PREDOMINANTLY INATTENTIVE TYPE: ICD-10-CM

## 2021-07-02 RX ORDER — EZETIMIBE 10 MG/1
10 TABLET ORAL DAILY
Qty: 90 TABLET | Refills: 1 | Status: SHIPPED | OUTPATIENT
Start: 2021-07-02

## 2021-08-03 DIAGNOSIS — F90.0 ATTENTION DEFICIT HYPERACTIVITY DISORDER (ADHD), PREDOMINANTLY INATTENTIVE TYPE: ICD-10-CM

## 2021-09-02 DIAGNOSIS — F90.0 ATTENTION DEFICIT HYPERACTIVITY DISORDER (ADHD), PREDOMINANTLY INATTENTIVE TYPE: ICD-10-CM

## 2021-09-03 NOTE — TELEPHONE ENCOUNTER
Jeimy Lira is requesting refill(s)   Last OV 3/08/2021 (pertaining to medication)  LR 08/03/2021 (per medication requested)  Next office visit scheduled or attempted 09/09/2021

## 2021-09-03 NOTE — TELEPHONE ENCOUNTER
[facility] called stating in the state [facility], a PA can not order this prescription. She requested a Physician order this.

## 2021-09-09 ENCOUNTER — OFFICE VISIT (OUTPATIENT)
Dept: FAMILY MEDICINE CLINIC | Age: 36
End: 2021-09-09
Payer: COMMERCIAL

## 2021-09-09 ENCOUNTER — PATIENT MESSAGE (OUTPATIENT)
Dept: FAMILY MEDICINE CLINIC | Age: 36
End: 2021-09-09

## 2021-09-09 VITALS
RESPIRATION RATE: 16 BRPM | HEART RATE: 82 BPM | BODY MASS INDEX: 27.3 KG/M2 | DIASTOLIC BLOOD PRESSURE: 74 MMHG | HEIGHT: 73 IN | WEIGHT: 206 LBS | OXYGEN SATURATION: 98 % | SYSTOLIC BLOOD PRESSURE: 102 MMHG

## 2021-09-09 DIAGNOSIS — I10 ESSENTIAL HYPERTENSION, BENIGN: Primary | ICD-10-CM

## 2021-09-09 DIAGNOSIS — F90.0 ATTENTION DEFICIT HYPERACTIVITY DISORDER (ADHD), PREDOMINANTLY INATTENTIVE TYPE: ICD-10-CM

## 2021-09-09 DIAGNOSIS — F41.8 DEPRESSION WITH ANXIETY: ICD-10-CM

## 2021-09-09 DIAGNOSIS — E78.00 PURE HYPERCHOLESTEROLEMIA: ICD-10-CM

## 2021-09-09 PROBLEM — K42.9 UMBILICAL HERNIA WITHOUT OBSTRUCTION AND WITHOUT GANGRENE: Status: RESOLVED | Noted: 2017-07-06 | Resolved: 2021-09-09

## 2021-09-09 LAB
ALBUMIN SERPL-MCNC: 4.7 G/DL (ref 3.4–5)
ALP BLD-CCNC: 59 U/L (ref 40–129)
ALT SERPL-CCNC: 30 U/L (ref 10–40)
ANION GAP SERPL CALCULATED.3IONS-SCNC: 14 MMOL/L (ref 3–16)
AST SERPL-CCNC: 32 U/L (ref 15–37)
BASOPHILS ABSOLUTE: 0.1 K/UL (ref 0–0.2)
BASOPHILS RELATIVE PERCENT: 0.7 %
BILIRUB SERPL-MCNC: <0.2 MG/DL (ref 0–1)
BILIRUBIN DIRECT: <0.2 MG/DL (ref 0–0.3)
BILIRUBIN, INDIRECT: NORMAL MG/DL (ref 0–1)
BUN BLDV-MCNC: 9 MG/DL (ref 7–20)
CALCIUM SERPL-MCNC: 10.9 MG/DL (ref 8.3–10.6)
CHLORIDE BLD-SCNC: 97 MMOL/L (ref 99–110)
CHOLESTEROL, TOTAL: 143 MG/DL (ref 0–199)
CO2: 25 MMOL/L (ref 21–32)
CREAT SERPL-MCNC: 1.1 MG/DL (ref 0.9–1.3)
EOSINOPHILS ABSOLUTE: 0.3 K/UL (ref 0–0.6)
EOSINOPHILS RELATIVE PERCENT: 3.4 %
GFR AFRICAN AMERICAN: >60
GFR NON-AFRICAN AMERICAN: >60
GLUCOSE BLD-MCNC: 86 MG/DL (ref 70–99)
HCT VFR BLD CALC: 43.1 % (ref 40.5–52.5)
HDLC SERPL-MCNC: 47 MG/DL (ref 40–60)
HEMOGLOBIN: 14.3 G/DL (ref 13.5–17.5)
LDL CHOLESTEROL CALCULATED: 76 MG/DL
LYMPHOCYTES ABSOLUTE: 2.6 K/UL (ref 1–5.1)
LYMPHOCYTES RELATIVE PERCENT: 26.8 %
MCH RBC QN AUTO: 30.4 PG (ref 26–34)
MCHC RBC AUTO-ENTMCNC: 33.2 G/DL (ref 31–36)
MCV RBC AUTO: 91.7 FL (ref 80–100)
MONOCYTES ABSOLUTE: 1 K/UL (ref 0–1.3)
MONOCYTES RELATIVE PERCENT: 9.9 %
NEUTROPHILS ABSOLUTE: 5.8 K/UL (ref 1.7–7.7)
NEUTROPHILS RELATIVE PERCENT: 59.2 %
PDW BLD-RTO: 14 % (ref 12.4–15.4)
PLATELET # BLD: 515 K/UL (ref 135–450)
PMV BLD AUTO: 8.7 FL (ref 5–10.5)
POTASSIUM SERPL-SCNC: 5.6 MMOL/L (ref 3.5–5.1)
RBC # BLD: 4.69 M/UL (ref 4.2–5.9)
SODIUM BLD-SCNC: 136 MMOL/L (ref 136–145)
TOTAL PROTEIN: 7.8 G/DL (ref 6.4–8.2)
TRIGL SERPL-MCNC: 102 MG/DL (ref 0–150)
VLDLC SERPL CALC-MCNC: 20 MG/DL
WBC # BLD: 9.8 K/UL (ref 4–11)

## 2021-09-09 PROCEDURE — 99214 OFFICE O/P EST MOD 30 MIN: CPT | Performed by: INTERNAL MEDICINE

## 2021-09-09 RX ORDER — AMLODIPINE BESYLATE 2.5 MG/1
2.5 TABLET ORAL DAILY
Qty: 30 TABLET | Refills: 5 | Status: SHIPPED | OUTPATIENT
Start: 2021-09-09

## 2021-09-09 RX ORDER — BENAZEPRIL HYDROCHLORIDE 10 MG/1
10 TABLET ORAL DAILY
Qty: 30 TABLET | Refills: 3 | Status: SHIPPED | OUTPATIENT
Start: 2021-09-09

## 2021-09-09 ASSESSMENT — ENCOUNTER SYMPTOMS
GASTROINTESTINAL NEGATIVE: 1
RESPIRATORY NEGATIVE: 1
ALLERGIC/IMMUNOLOGIC NEGATIVE: 1

## 2021-09-09 NOTE — TELEPHONE ENCOUNTER
From: Moise Ng  To: Candance Fore, MD  Sent: 9/9/2021 9:45 AM EDT  Subject: Non-Urgent Medical Question    Covid 19 vaccine card

## 2021-09-09 NOTE — PROGRESS NOTES
Faizan Snyder presents today for   Chief Complaint   Patient presents with    Hypertension     6 MNTH FASTING        Essential hypertension, benign  BP has been 100-120/60-70s. Some lightheadedness. Pure hypercholesterolemia   No c/o w/ meds. Last labs(2/2020) show slight increase Cholesterol and TGs compared to prior lab.lost 5 lbs since last labs. Depression with anxiety  Has improved w/ med. No recent change in meds or c/o. Sleeping better. Not seeing Counselor over last yr. Attention deficit hyperactivity disorder (ADHD), predominantly inattentive type  Dx by Psych and started on Vyvance and doing better. Review of Systems   Constitutional: Negative. Respiratory: Negative. Cardiovascular: Negative. Gastrointestinal: Negative. Endocrine: Negative. Genitourinary: Negative. Musculoskeletal: Negative. Skin: Negative. Allergic/Immunologic: Negative. Neurological: Negative. Hematological: Negative. Psychiatric/Behavioral: Positive for decreased concentration. The patient is nervous/anxious. Vitals:    09/09/21 0940   BP: 102/74   Pulse: 82   Resp: 16   SpO2: 98%   Weight: 206 lb (93.4 kg)   Height: 6' 1\" (1.854 m)        Physical Exam  Constitutional:       General: He is not in acute distress. Appearance: Normal appearance. He is well-developed. He is not diaphoretic. HENT:      Head: Normocephalic and atraumatic. Neck:      Thyroid: No thyroid mass or thyromegaly. Vascular: Normal carotid pulses. No carotid bruit, hepatojugular reflux or JVD. Trachea: Trachea and phonation normal.   Cardiovascular:      Rate and Rhythm: Normal rate and regular rhythm. No extrasystoles are present. Chest Wall: PMI is not displaced. Pulses: Normal pulses. No decreased pulses. Carotid pulses are 2+ on the right side and 2+ on the left side. Radial pulses are 2+ on the right side and 2+ on the left side.         Femoral pulses are 2+ on the right side and 2+ on the left side. Popliteal pulses are 2+ on the right side and 2+ on the left side. Dorsalis pedis pulses are 2+ on the right side and 2+ on the left side. Posterior tibial pulses are 2+ on the right side and 2+ on the left side. Heart sounds: Normal heart sounds. No murmur heard. No friction rub. No gallop. Pulmonary:      Effort: Pulmonary effort is normal. No tachypnea, bradypnea, accessory muscle usage or respiratory distress. Breath sounds: Normal breath sounds. No decreased breath sounds, wheezing, rhonchi or rales. Abdominal:      Hernia: No hernia is present. There is no hernia in the ventral area. Musculoskeletal:      Cervical back: Full passive range of motion without pain, normal range of motion and neck supple. Right lower leg: No edema. Left lower leg: No edema. Skin:     General: Skin is warm and dry. Capillary Refill: Capillary refill takes less than 2 seconds. Coloration: Skin is not jaundiced or pale. Findings: No abrasion, bruising, lesion or rash. Nails: There is no clubbing. Neurological:      General: No focal deficit present. Mental Status: He is alert and oriented to person, place, and time. Mental status is at baseline. He is not disoriented. Cranial Nerves: No cranial nerve deficit. Sensory: No sensory deficit. Motor: No weakness, tremor, atrophy or abnormal muscle tone. Coordination: Coordination normal.      Gait: Gait normal.   Psychiatric:         Mood and Affect: Mood normal.         Speech: Speech normal.         Behavior: Behavior normal.         Thought Content: Thought content normal.         Judgment: Judgment normal.          A/P:     Attention Deficit Hyperactivity Disorder (Adhd), Predominantly Inattentive Type. Continue meds. Essential Hypertension, Benign. Changed present meds(decreased Benazipril and Amlodipine). Home BP checks. Call if>140/90.  Improve diet. Avoid caffeine and salt. Call if new c/o w/ meds. Pure Hypercholesterolemia. Continue diet and stop Fenofibrate. Will do labs. Depression With Anxiety. Continue meds. Call if new c/o.               Chayo Kenny MD

## 2021-10-04 DIAGNOSIS — F90.0 ATTENTION DEFICIT HYPERACTIVITY DISORDER (ADHD), PREDOMINANTLY INATTENTIVE TYPE: ICD-10-CM

## 2021-10-04 RX ORDER — BUPROPION HYDROCHLORIDE 300 MG/1
TABLET ORAL
Qty: 90 TABLET | Refills: 1 | Status: SHIPPED | OUTPATIENT
Start: 2021-10-04

## 2021-10-04 RX ORDER — BUPROPION HYDROCHLORIDE 150 MG/1
TABLET ORAL
Qty: 90 TABLET | Refills: 1 | Status: SHIPPED | OUTPATIENT
Start: 2021-10-04

## 2021-11-05 DIAGNOSIS — F90.0 ATTENTION DEFICIT HYPERACTIVITY DISORDER (ADHD), PREDOMINANTLY INATTENTIVE TYPE: ICD-10-CM

## 2022-05-09 RX ORDER — BUPROPION HYDROCHLORIDE 150 MG/1
TABLET ORAL
Qty: 90 TABLET | Refills: 1 | OUTPATIENT
Start: 2022-05-09

## 2023-02-21 DIAGNOSIS — F90.0 ATTENTION DEFICIT HYPERACTIVITY DISORDER (ADHD), PREDOMINANTLY INATTENTIVE TYPE: ICD-10-CM

## 2023-02-22 DIAGNOSIS — F90.0 ATTENTION DEFICIT HYPERACTIVITY DISORDER (ADHD), PREDOMINANTLY INATTENTIVE TYPE: ICD-10-CM

## 2023-02-24 DIAGNOSIS — F90.0 ATTENTION DEFICIT HYPERACTIVITY DISORDER (ADHD), PREDOMINANTLY INATTENTIVE TYPE: ICD-10-CM

## 2025-01-03 DIAGNOSIS — F90.0 ATTENTION DEFICIT HYPERACTIVITY DISORDER (ADHD), PREDOMINANTLY INATTENTIVE TYPE: ICD-10-CM

## 2025-01-03 RX ORDER — LISDEXAMFETAMINE DIMESYLATE 50 MG/1
50 CAPSULE ORAL EVERY MORNING
Qty: 30 CAPSULE | Refills: 0 | OUTPATIENT
Start: 2025-01-03 | End: 2025-02-02